# Patient Record
Sex: FEMALE | Race: WHITE | Employment: FULL TIME | URBAN - METROPOLITAN AREA
[De-identification: names, ages, dates, MRNs, and addresses within clinical notes are randomized per-mention and may not be internally consistent; named-entity substitution may affect disease eponyms.]

---

## 2017-01-31 ENCOUNTER — HOSPITAL ENCOUNTER (OUTPATIENT)
Dept: MAMMOGRAPHY | Age: 52
Discharge: HOME | End: 2017-01-31
Attending: FAMILY MEDICINE
Payer: COMMERCIAL

## 2017-01-31 DIAGNOSIS — Z01.419 ENCOUNTER FOR GYNECOLOGICAL EXAMINATION WITHOUT ABNORMAL FINDING: ICD-10-CM

## 2017-01-31 PROCEDURE — 77067 SCR MAMMO BI INCL CAD: CPT

## 2017-02-06 ENCOUNTER — OFFICE VISIT (OUTPATIENT)
Dept: FAMILY MEDICINE CLINIC | Age: 52
End: 2017-02-06

## 2017-02-06 VITALS
SYSTOLIC BLOOD PRESSURE: 140 MMHG | RESPIRATION RATE: 20 BRPM | HEIGHT: 62 IN | DIASTOLIC BLOOD PRESSURE: 73 MMHG | BODY MASS INDEX: 35.19 KG/M2 | WEIGHT: 191.2 LBS | TEMPERATURE: 97.9 F | HEART RATE: 74 BPM

## 2017-02-06 DIAGNOSIS — D22.9 NEVUS: Primary | ICD-10-CM

## 2017-02-06 DIAGNOSIS — I10 ESSENTIAL HYPERTENSION: ICD-10-CM

## 2017-02-06 NOTE — PROGRESS NOTES
HISTORY OF PRESENT ILLNESS  Kervin Villalta is a 45 y.o. female. Mole   The history is provided by the patient (right breast). This is a chronic problem. The current episode started more than 1 week ago. The problem occurs constantly. Progression since onset: it looked different last week, but is back to normal this week. Associated symptoms comments: No breast lumps or nipple discharge. Nothing aggravates the symptoms. Nothing relieves the symptoms. She has tried nothing for the symptoms. Review of Systems   Genitourinary:        No breast lumps or nipple discharge   Skin: Negative for itching. mole       Visit Vitals    /73 (BP 1 Location: Left arm, BP Patient Position: Sitting)    Pulse 74    Temp 97.9 °F (36.6 °C) (Oral)    Resp 20    Ht 5' 2\" (1.575 m)    Wt 191 lb 3.2 oz (86.7 kg)    LMP 01/24/2017 (Exact Date)    BMI 34.97 kg/m2     BP Readings from Last 3 Encounters:   02/06/17 140/73   10/31/16 122/72   09/19/16 130/68     Physical Exam   Constitutional: She is oriented to person, place, and time. She appears well-developed and well-nourished. No distress. Neurological: She is alert and oriented to person, place, and time. Skin: She is not diaphoretic. ASSESSMENT and PLAN    ICD-10-CM ICD-9-CM    1. Nevus D22.9 216.9    2. Essential hypertension I10 401.9         Benign nevus  Blood pressure elevated  Reassured about the benign nature of her nevus  Will follow blood pressure    Follow-up Disposition:  Return in about 6 weeks (around 3/20/2017) for blood pressure. Reviewed plan of care. Patient has provided input and agrees with goals.

## 2017-02-06 NOTE — MR AVS SNAPSHOT
Visit Information Date & Time Provider Department Dept. Phone Encounter #  
 2/6/2017  3:30 PM Alphonso CareyNick 34 [id number] Your Appointments 5/1/2017  8:00 AM  
ROUTINE CARE with Alphonso Carey MD  
[address] [facility]) Appt Note: 6 month follow up HTN  
 [address] [facility]  
[phone]  
  
   
 [address] [facility] [place] [facility] [address] [address] Upcoming Health Maintenance Date Due  
 BREAST CANCER SCRN MAMMOGRAM 1/31/2019 PAP AKA CERVICAL CYTOLOGY 10/31/2019 DTaP/Tdap/Td series (2 - Td) 7/21/2023 COLONOSCOPY 8/9/2023 Allergies as of 2/6/2017  Review Complete On: 2/6/2017 By: Alphonso Carey MD  
  
 Severity Noted Reaction Type Reactions Erythromycin  09/27/2011   Systemic Other (comments) Causes stomach pain Flagyl [Metronidazole]  09/28/2011    Nausea Only Pcn [Penicillins]  08/08/2011    Rash Current Immunizations  Reviewed on 10/31/2016 Name Date Influenza Vaccine 11/3/2014 Influenza Vaccine Split 11/27/2012, 9/27/2011 12:19 PM  
 Tdap 7/21/2013  1:52 PM  
  
 Not reviewed this visit Vitals BP Pulse Temp Resp Height(growth percentile) Weight(growth percentile) 140/73 (BP 1 Location: Left arm, BP Patient Position: Sitting) 74 97.9 °F (36.6 °C) (Oral) 20 5' 2\" (1.575 m) 191 lb 3.2 oz (86.7 kg) LMP BMI OB Status Smoking Status 01/24/2017 (Exact Date) 34.97 kg/m2 Having regular periods Never Smoker BMI and BSA Data Body Mass Index Body Surface Area 34.97 kg/m 2 1.95 m 2 Preferred Pharmacy Pharmacy Name Phone [facility] & [address] [phone] Your Updated Medication List  
  
   
This list is accurate as of: 2/6/17  4:02 PM.  Always use your most recent med list.  
  
  
  
  
 CALCIUM 600 + D 600-125 mg-unit Tab Generic drug:  calcium-cholecalciferol (d3) Take 600 mg by mouth two (2) times a day. ERGOCALCIFEROL (VITAMIN D2) PO Take 1,000 mg by mouth daily. metoprolol succinate 25 mg XL tablet Commonly known as:  TOPROL-XL  
take 1 tablet by mouth once daily  
  
 montelukast 10 mg tablet Commonly known as:  SINGULAIR  
take 1 tablet by mouth once daily  
  
 omeprazole 40 mg capsule Commonly known as:  PRILOSEC  
take 1 capsule by mouth once daily SPIRIVA WITH HANDIHALER 18 mcg inhalation capsule Generic drug:  tiotropium  
inhale the contents of one capsule in the handihaler once daily  
  
 vitamin E 400 unit capsule Commonly known as:  [address] Take 400 Units by mouth two (2) times a day. Introducing [facility] & HEALTH SERVICES! Dear Kelin Flannery: Thank you for requesting a Virool account. Our records indicate that you already have an active Virool account. You can access your account anytime at [email or web address] Did you know that you can access your hospital and ER discharge instructions at any time in Virool? You can also review all of your test results from your hospital stay or ER visit. Additional Information If you have questions, please visit the Frequently Asked Questions section of the Virool website at [email or web address] Remember, Virool is NOT to be used for urgent needs. For medical emergencies, dial 911. Now available from your iPhone and Android! Please provide this summary of care documentation to your next provider. Your primary care clinician is listed as Erica Scanlon. If you have any questions after today's visit, please call [phone].

## 2017-04-28 RX ORDER — METOPROLOL SUCCINATE 25 MG/1
TABLET, EXTENDED RELEASE ORAL
Qty: 30 TAB | Refills: 1 | Status: SHIPPED | OUTPATIENT
Start: 2017-04-28 | End: 2017-05-01 | Stop reason: SDUPTHER

## 2017-04-29 RX ORDER — MONTELUKAST SODIUM 10 MG/1
TABLET ORAL
Qty: 30 TAB | Refills: 11 | Status: SHIPPED | OUTPATIENT
Start: 2017-04-29 | End: 2018-04-12 | Stop reason: SDUPTHER

## 2017-05-01 ENCOUNTER — OFFICE VISIT (OUTPATIENT)
Dept: FAMILY MEDICINE CLINIC | Age: 52
End: 2017-05-01

## 2017-05-01 VITALS
HEART RATE: 80 BPM | WEIGHT: 191 LBS | SYSTOLIC BLOOD PRESSURE: 134 MMHG | HEIGHT: 62 IN | TEMPERATURE: 97.7 F | DIASTOLIC BLOOD PRESSURE: 71 MMHG | BODY MASS INDEX: 35.15 KG/M2 | RESPIRATION RATE: 20 BRPM

## 2017-05-01 DIAGNOSIS — K21.9 GASTROESOPHAGEAL REFLUX DISEASE, ESOPHAGITIS PRESENCE NOT SPECIFIED: ICD-10-CM

## 2017-05-01 DIAGNOSIS — I10 ESSENTIAL HYPERTENSION: Primary | ICD-10-CM

## 2017-05-01 RX ORDER — METOPROLOL SUCCINATE 25 MG/1
TABLET, EXTENDED RELEASE ORAL
Qty: 30 TAB | Refills: 11 | Status: SHIPPED | OUTPATIENT
Start: 2017-05-01 | End: 2018-05-16 | Stop reason: SDUPTHER

## 2017-05-01 NOTE — MR AVS SNAPSHOT
Visit Information Date & Time Provider Department Dept. Phone Encounter #  
 5/1/2017  8:00 AM Marco Garnett MD [facility] 34 [id number] Follow-up Instructions Return in about 6 months (around 11/1/2017) for blood pressure. Upcoming Health Maintenance Date Due INFLUENZA AGE 9 TO ADULT 8/1/2017 BREAST CANCER SCRN MAMMOGRAM 1/31/2019 PAP AKA CERVICAL CYTOLOGY 10/31/2019 DTaP/Tdap/Td series (2 - Td) 7/21/2023 COLONOSCOPY 8/9/2023 Allergies as of 5/1/2017  Review Complete On: 5/1/2017 By: Marco Garnett MD  
  
 Severity Noted Reaction Type Reactions Erythromycin  09/27/2011   Systemic Other (comments) Causes stomach pain Flagyl [Metronidazole]  09/28/2011    Nausea Only Pcn [Penicillins]  08/08/2011    Rash Current Immunizations  Reviewed on 10/31/2016 Name Date Influenza Vaccine 11/3/2014 Influenza Vaccine Split 11/27/2012, 9/27/2011 12:19 PM  
 Tdap 7/21/2013  1:52 PM  
  
 Not reviewed this visit You Were Diagnosed With   
  
 Codes Comments Essential hypertension    -  Primary ICD-10-CM: I10 
ICD-9-CM: 401.9 Gastroesophageal reflux disease, esophagitis presence not specified     ICD-10-CM: K21.9 ICD-9-CM: 530.81 Vitals BP Pulse Temp Resp Height(growth percentile) Weight(growth percentile) 134/71 80 97.7 °F (36.5 °C) (Oral) 20 5' 2\" (1.575 m) 191 lb (86.6 kg) BMI OB Status Smoking Status 34.93 kg/m2 Having regular periods Never Smoker BMI and BSA Data Body Mass Index Body Surface Area 34.93 kg/m 2 1.95 m 2 Preferred Pharmacy Pharmacy Name Phone [facility] [address] [phone] Your Updated Medication List  
  
   
This list is accurate as of: 5/1/17  8:23 AM.  Always use your most recent med list.  
  
  
  
  
 CALCIUM 600 + D 600-125 mg-unit Tab Generic drug:  calcium-cholecalciferol (d3) Take 600 mg by mouth two (2) times a day. ERGOCALCIFEROL (VITAMIN D2) PO Take 1,000 mg by mouth daily. metoprolol succinate 25 mg XL tablet Commonly known as:  TOPROL-XL  
take 1 tablet once daily  
  
 montelukast 10 mg tablet Commonly known as:  SINGULAIR  
take 1 tablet by mouth once daily  
  
 tiotropium 18 mcg inhalation capsule Commonly known as:  [address]  
inhale the contents of one capsule in the handihaler once daily  
  
 vitamin E 400 unit capsule Commonly known as:  [address] Take 400 Units by mouth two (2) times a day. Prescriptions Sent to Pharmacy Refills  
 metoprolol succinate (TOPROL-XL) 25 mg XL tablet 11 Sig: take 1 tablet once daily Class: Normal  
 Pharmacy: [facility] [address], [facility] [address] [facility] Ph #: [phone] We Performed the Following METABOLIC PANEL, BASIC [02737 CPT(R)] Follow-up Instructions Return in about 6 months (around 11/1/2017) for blood pressure. Introducing [facility] & HEALTH SERVICES! Dear Mimi Dean: Thank you for requesting a DeskActive account. Our records indicate that you already have an active DeskActive account. You can access your account anytime at [email or web address] Did you know that you can access your hospital and ER discharge instructions at any time in DeskActive? You can also review all of your test results from your hospital stay or ER visit. Additional Information If you have questions, please visit the Frequently Asked Questions section of the DeskActive website at [email or web address] Remember, DeskActive is NOT to be used for urgent needs. For medical emergencies, dial 911. Now available from your iPhone and Android! Please provide this summary of care documentation to your next provider. Your primary care clinician is listed as Shelia Mcclure.  If you have any questions after today's visit, please call [phone].

## 2017-05-01 NOTE — PROGRESS NOTES
HISTORY OF PRESENT ILLNESS  Yaw Gonzalez is a 45 y.o. female. Hypertension   The history is provided by the patient. This is a chronic problem. The current episode started more than 1 week ago. The problem occurs constantly. The problem has been gradually improving. Pertinent negatives include no chest pain, no abdominal pain, no headaches and no shortness of breath. Nothing aggravates the symptoms. The symptoms are relieved by medications. Treatments tried: Toprol XL. The treatment provided significant relief. Other   The history is provided by the patient (she would like to stop Prilosec. No GERD symptoms. ). Pertinent negatives include no chest pain, no abdominal pain, no headaches and no shortness of breath. Review of Systems   Constitutional: Negative for weight loss. No weight gain   Eyes: Negative for blurred vision. Respiratory: Negative for shortness of breath. Cardiovascular: Negative for chest pain and leg swelling. Gastrointestinal: Negative for abdominal pain, heartburn, nausea and vomiting. Neurological: Negative for dizziness, sensory change, speech change, focal weakness and headaches. Visit Vitals    /71    Pulse 80    Temp 97.7 °F (36.5 °C) (Oral)    Resp 20    Ht 5' 2\" (1.575 m)    Wt 191 lb (86.6 kg)    BMI 34.93 kg/m2     BP Readings from Last 3 Encounters:   05/01/17 134/71   02/06/17 140/73   10/31/16 122/72     Physical Exam   Constitutional: She is oriented to person, place, and time. She appears well-developed and well-nourished. No distress. Cardiovascular: Normal rate, regular rhythm and normal heart sounds. Exam reveals no gallop and no friction rub. No murmur heard. Pulmonary/Chest: Effort normal and breath sounds normal. No respiratory distress. She has no wheezes. She has no rales. Musculoskeletal: She exhibits no edema. Neurological: She is alert and oriented to person, place, and time. Skin: Skin is warm and dry.  She is not diaphoretic. Nursing note and vitals reviewed. ASSESSMENT and PLAN    ICD-10-CM ICD-9-CM    1. Essential hypertension U68 801.9 METABOLIC PANEL, BASIC   2. Gastroesophageal reflux disease, esophagitis presence not specified K21.9 530.81         Blood pressure controlled  Asymptomatic gastroesophageal reflux disease  Labs per orders. Continue current plans. Trial off of Prilosec - she will let me know if she develops symptoms    Follow-up Disposition:  Return in about 6 months (around 11/1/2017) for blood pressure. Reviewed plan of care. Patient has provided input and agrees with goals.

## 2017-05-02 LAB
BUN SERPL-MCNC: 10 MG/DL (ref 6–24)
BUN/CREAT SERPL: 14 (ref 9–23)
CALCIUM SERPL-MCNC: 10 MG/DL (ref 8.7–10.2)
CHLORIDE SERPL-SCNC: 100 MMOL/L (ref 96–106)
CO2 SERPL-SCNC: 25 MMOL/L (ref 18–29)
CREAT SERPL-MCNC: 0.74 MG/DL (ref 0.57–1)
GLUCOSE SERPL-MCNC: 95 MG/DL (ref 65–99)
POTASSIUM SERPL-SCNC: 4.7 MMOL/L (ref 3.5–5.2)
SODIUM SERPL-SCNC: 142 MMOL/L (ref 134–144)

## 2017-05-19 ENCOUNTER — OFFICE VISIT (OUTPATIENT)
Dept: FAMILY MEDICINE CLINIC | Age: 52
End: 2017-05-19

## 2017-05-19 VITALS
RESPIRATION RATE: 20 BRPM | BODY MASS INDEX: 35.15 KG/M2 | DIASTOLIC BLOOD PRESSURE: 68 MMHG | HEART RATE: 56 BPM | SYSTOLIC BLOOD PRESSURE: 135 MMHG | WEIGHT: 191 LBS | HEIGHT: 62 IN | TEMPERATURE: 98 F

## 2017-05-19 DIAGNOSIS — K21.9 GASTROESOPHAGEAL REFLUX DISEASE, ESOPHAGITIS PRESENCE NOT SPECIFIED: Primary | ICD-10-CM

## 2017-05-19 RX ORDER — RANITIDINE 300 MG/1
300 TABLET ORAL DAILY
Qty: 30 TAB | Refills: 1 | Status: SHIPPED | OUTPATIENT
Start: 2017-05-19 | End: 2017-07-12 | Stop reason: SDUPTHER

## 2017-05-19 RX ORDER — BISMUTH SUBSALICYLATE 262 MG
1 TABLET,CHEWABLE ORAL DAILY
COMMUNITY
End: 2018-04-09

## 2017-05-19 NOTE — PROGRESS NOTES
HISTORY OF PRESENT ILLNESS  Caitlyn Lehman is a 45 y.o. female. GERD   The history is provided by the patient. This is a chronic problem. The current episode started more than 1 week ago. Episode frequency: nightly. The problem has been gradually worsening. Pertinent negatives include no chest pain, no abdominal pain and no shortness of breath. Exacerbated by: stopping Prilosec. Nothing relieves the symptoms. She has tried nothing for the symptoms. Review of Systems   HENT:        Burning in the back of the throat   Respiratory: Positive for cough. Negative for sputum production, shortness of breath and wheezing. Cardiovascular: Negative for chest pain. Gastrointestinal: Positive for heartburn and nausea. Negative for abdominal pain and vomiting. Visit Vitals    /68    Pulse (!) 56    Temp 98 °F (36.7 °C) (Oral)    Resp 20    Ht 5' 2\" (1.575 m)    Wt 191 lb (86.6 kg)    BMI 34.93 kg/m2     Physical Exam   Constitutional: She is oriented to person, place, and time. She appears well-developed and well-nourished. No distress. Cardiovascular: Normal rate, regular rhythm and normal heart sounds. Exam reveals no gallop and no friction rub. No murmur heard. Pulmonary/Chest: Effort normal and breath sounds normal. No respiratory distress. She has no wheezes. She has no rales. Abdominal: Soft. Normal appearance and bowel sounds are normal. She exhibits no distension. There is no hepatosplenomegaly. There is no tenderness. There is no rebound and no guarding. Neurological: She is alert and oriented to person, place, and time. Skin: Skin is warm and dry. She is not diaphoretic. Nursing note and vitals reviewed. ASSESSMENT and PLAN    ICD-10-CM ICD-9-CM    1.  Gastroesophageal reflux disease, esophagitis presence not specified K21.9 530.81 raNITIdine (ZANTAC) 300 mg tablet        Symptomatic now that she has stopped Prilosec  Start Zantac    Follow-up Disposition:  Return in about 6 weeks (around 6/30/2017) for GERD. Reviewed plan of care. Patient has provided input and agrees with goals.

## 2017-05-19 NOTE — MR AVS SNAPSHOT
Visit Information Date & Time Provider Department Dept. Phone Encounter #  
 5/19/2017  1:15 PM Letty Goltz, MD [address] [address] [id number] Follow-up Instructions Return in about 6 weeks (around 6/30/2017) for GERD. Your Appointments 11/1/2017  8:15 AM  
COMPLETE PHYSICAL with Letty Goltz, MD  
[address] [facility]) Appt Note: Complete Physical, BP f/u  
 [address] [facility] [address] [facility]  
[phone]  
  
   
 [address] [place] [address] [address] [address] Upcoming Health Maintenance Date Due INFLUENZA AGE 9 TO ADULT 8/1/2017 BREAST CANCER SCRN MAMMOGRAM 1/31/2019 PAP AKA CERVICAL CYTOLOGY 10/31/2019 DTaP/Tdap/Td series (2 - Td) 7/21/2023 COLONOSCOPY 8/9/2023 Allergies as of 5/19/2017  Review Complete On: 5/19/2017 By: Letty Goltz, MD  
  
 Severity Noted Reaction Type Reactions Erythromycin  09/27/2011   Systemic Other (comments) Causes stomach pain Flagyl [Metronidazole]  09/28/2011    Nausea Only Pcn [Penicillins]  08/08/2011    Rash Current Immunizations  Reviewed on 10/31/2016 Name Date Influenza Vaccine 11/3/2014 Influenza Vaccine Split 11/27/2012, 9/27/2011 12:19 PM  
 Tdap 7/21/2013  1:52 PM  
  
 Not reviewed this visit You Were Diagnosed With   
  
 Codes Comments Gastroesophageal reflux disease, esophagitis presence not specified    -  Primary ICD-10-CM: K21.9 ICD-9-CM: 530.81 Vitals BP Pulse Temp Resp Height(growth percentile) Weight(growth percentile) 135/68 (!) 56 98 °F (36.7 °C) (Oral) 20 5' 2\" (1.575 m) 191 lb (86.6 kg) BMI OB Status Smoking Status 34.93 kg/m2 Having regular periods Never Smoker Vitals History BMI and BSA Data Body Mass Index Body Surface Area 34.93 kg/m 2 1.95 m 2 Preferred Pharmacy Pharmacy Name Phone [facility] [address] [facility] [phone] Your Updated Medication List  
  
   
This list is accurate as of: 5/19/17  1:39 PM.  Always use your most recent med list.  
  
  
  
  
 CALCIUM 600 + D 600-125 mg-unit Tab Generic drug:  calcium-cholecalciferol (d3) Take 600 mg by mouth two (2) times a day. ERGOCALCIFEROL (VITAMIN D2) PO Take 1,000 mg by mouth daily. metoprolol succinate 25 mg XL tablet Commonly known as:  TOPROL-XL  
take 1 tablet once daily  
  
 montelukast 10 mg tablet Commonly known as:  SINGULAIR  
take 1 tablet by mouth once daily  
  
 multivitamin tablet Commonly known as:  ONE A DAY Take 1 Tab by mouth daily. raNITIdine 300 mg tablet Commonly known as:  ZANTAC Take 1 Tab by mouth daily. tiotropium 18 mcg inhalation capsule Commonly known as:  [address]  
inhale the contents of one capsule in the handihaler once daily  
  
 vitamin E 400 unit capsule Commonly known as:  [address] Take 400 Units by mouth two (2) times a day. Prescriptions Sent to Pharmacy Refills  
 raNITIdine (ZANTAC) 300 mg tablet 1 Sig: Take 1 Tab by mouth daily. Class: Normal  
 Pharmacy: [id number], [facility] [address] [facility] Ph #: [phone] Route: Oral  
  
Follow-up Instructions Return in about 6 weeks (around 6/30/2017) for GERD. Introducing [facility] & HEALTH SERVICES! Dear Charis Sidhu: Thank you for requesting a Star Scientific account. Our records indicate that you already have an active Star Scientific account. You can access your account anytime at [email or web address] Did you know that you can access your hospital and ER discharge instructions at any time in Star Scientific? You can also review all of your test results from your hospital stay or ER visit. Additional Information If you have questions, please visit the Frequently Asked Questions section of the [email or web address] website at [email or web address] Remember, [email or web address] is NOT to be used for urgent needs. For medical emergencies, dial 911. Now available from your iPhone and Android! Please provide this summary of care documentation to your next provider. Your primary care clinician is listed as Mariam Saucedo. If you have any questions after today's visit, please call [phone].

## 2017-05-25 DIAGNOSIS — R11.0 NAUSEA: ICD-10-CM

## 2017-05-25 DIAGNOSIS — K21.9 GASTROESOPHAGEAL REFLUX DISEASE, ESOPHAGITIS PRESENCE NOT SPECIFIED: ICD-10-CM

## 2017-05-25 DIAGNOSIS — R05.9 COUGH: ICD-10-CM

## 2017-05-25 RX ORDER — OMEPRAZOLE 40 MG/1
40 CAPSULE, DELAYED RELEASE ORAL DAILY
Qty: 30 CAP | Refills: 11 | Status: SHIPPED | OUTPATIENT
Start: 2017-05-25 | End: 2017-06-30

## 2017-06-30 ENCOUNTER — OFFICE VISIT (OUTPATIENT)
Dept: FAMILY MEDICINE CLINIC | Age: 52
End: 2017-06-30

## 2017-06-30 VITALS
HEIGHT: 62 IN | WEIGHT: 191 LBS | BODY MASS INDEX: 35.15 KG/M2 | TEMPERATURE: 98.3 F | DIASTOLIC BLOOD PRESSURE: 58 MMHG | RESPIRATION RATE: 20 BRPM | SYSTOLIC BLOOD PRESSURE: 123 MMHG

## 2017-06-30 DIAGNOSIS — K21.9 GASTROESOPHAGEAL REFLUX DISEASE, ESOPHAGITIS PRESENCE NOT SPECIFIED: Primary | ICD-10-CM

## 2017-06-30 DIAGNOSIS — M79.601 MUSCULOSKELETAL ARM PAIN, RIGHT: ICD-10-CM

## 2017-06-30 RX ORDER — IBUPROFEN 600 MG/1
600 TABLET ORAL
Qty: 40 TAB | Refills: 0 | Status: SHIPPED | OUTPATIENT
Start: 2017-06-30 | End: 2018-04-09

## 2017-06-30 NOTE — PROGRESS NOTES
Chief Complaint   Patient presents with    GERD     follow up     Arm Pain     right arm when raising it      Health Maintenance   Topic Date Due    INFLUENZA AGE 9 TO ADULT  08/01/2017    BREAST CANCER SCRN MAMMOGRAM  01/31/2019    PAP AKA CERVICAL CYTOLOGY  10/31/2019    DTaP/Tdap/Td series (2 - Td) 07/21/2023    COLONOSCOPY  08/09/2023    Hepatitis C Screening  Completed     1. Have you been to the ER, urgent care clinic since your last visit? Hospitalized since your last visit? No    2. Have you seen or consulted any other health care providers outside of the [address] [place] [address] since your last visit? Include any pap smears or colon screening.  No

## 2017-06-30 NOTE — PROGRESS NOTES
HISTORY OF PRESENT ILLNESS  Michael Wilson is a 45 y.o. female. GERD   The history is provided by the patient (she switched from Prilosec to Zantac). This is a chronic problem. The current episode started more than 1 week ago. Episode frequency: 2-3 days every 2 weeks. The problem has been gradually worsening. Pertinent negatives include no abdominal pain. Associated symptoms comments: Burning at the base of the neck. The symptoms are trace. . Exacerbated by: certain foods. The symptoms are relieved by medications. Treatments tried: Zantac. The treatment provided significant relief. Arm Pain   The history is provided by the patient (right pain when abbducting it at shoulder level. No history of trauma. ). This is a new problem. Episode onset: about 2 weeks ago. The problem occurs daily. The problem has not changed since onset. Pertinent negatives include no abdominal pain. Associated symptoms comments: No radiation. Nothing (certain arms positions) aggravates the symptoms. Nothing relieves the symptoms. She has tried nothing for the symptoms. Review of Systems   Constitutional: Negative for weight loss. No weight gain   Respiratory: Negative for cough and wheezing. Gastrointestinal: Positive for heartburn. Negative for abdominal pain. Neurological: Positive for tingling, sensory change and focal weakness. Visit Vitals    /58    Temp 98.3 °F (36.8 °C) (Oral)    Resp 20    Ht 5' 2\" (1.575 m)    Wt 191 lb (86.6 kg)    BMI 34.93 kg/m2     Physical Exam   Constitutional: She is oriented to person, place, and time. She appears well-developed and well-nourished. No distress. Cardiovascular: Normal rate, regular rhythm and normal heart sounds. Exam reveals no gallop and no friction rub. No murmur heard. Pulmonary/Chest: Effort normal and breath sounds normal. No respiratory distress. She has no wheezes. She has no rales. Abdominal: Soft.  Normal appearance and bowel sounds are normal. She exhibits no distension. There is no hepatosplenomegaly. There is no tenderness. There is no rebound and no guarding. Musculoskeletal:        Right shoulder: She exhibits normal range of motion, no tenderness, no bony tenderness and normal strength. Right upper arm: She exhibits tenderness. She exhibits no bony tenderness and no swelling. Right triceps is mildly tenderness   Neurological: She is alert and oriented to person, place, and time. Skin: Skin is warm and dry. She is not diaphoretic. Nursing note and vitals reviewed. ASSESSMENT and PLAN    ICD-10-CM ICD-9-CM    1. Gastroesophageal reflux disease, esophagitis presence not specified K21.9 530.81    2. Musculoskeletal arm pain, right M79.601 729.5 ibuprofen (MOTRIN) 600 mg tablet        Gastroesophageal reflux disease doing well off PPI and on Zantac  Arm pain likely due to overuse  Continue current plans. Rest, heat, stretching, Motrin prn arm pain    Follow-up Disposition:  Return if symptoms worsen or fail to improve. Reviewed plan of care. Patient has provided input and agrees with goals.

## 2017-06-30 NOTE — MR AVS SNAPSHOT
Visit Information Date & Time Provider Department Dept. Phone Encounter #  
 6/30/2017  8:00 AM Dorina Gonzalez, [address] [id number] Follow-up Instructions Return if symptoms worsen or fail to improve. Your Appointments 11/1/2017  8:15 AM  
COMPLETE PHYSICAL with Dorina Gonzalez MD  
[address]) Appt Note: Complete Physical, BP f/u  
 [address] [phone] [address] [facility] [address] [address] Upcoming Health Maintenance Date Due INFLUENZA AGE 9 TO ADULT 8/1/2017 BREAST CANCER SCRN MAMMOGRAM 1/31/2019 PAP AKA CERVICAL CYTOLOGY 10/31/2019 DTaP/Tdap/Td series (2 - Td) 7/21/2023 COLONOSCOPY 8/9/2023 Allergies as of 6/30/2017  Review Complete On: 6/30/2017 By: Dorina Gonzalez MD  
  
 Severity Noted Reaction Type Reactions Erythromycin  09/27/2011   Systemic Other (comments) Causes stomach pain Flagyl [Metronidazole]  09/28/2011    Nausea Only Pcn [Penicillins]  08/08/2011    Rash Current Immunizations  Reviewed on 10/31/2016 Name Date Influenza Vaccine 11/3/2014 Influenza Vaccine Split 11/27/2012, 9/27/2011 12:19 PM  
 Tdap 7/21/2013  1:52 PM  
  
 Not reviewed this visit You Were Diagnosed With   
  
 Codes Comments Gastroesophageal reflux disease, esophagitis presence not specified    -  Primary ICD-10-CM: K21.9 ICD-9-CM: 530.81 Musculoskeletal arm pain, right     ICD-10-CM: M79.601 ICD-9-CM: 729.5 Vitals BP Temp Resp Height(growth percentile) Weight(growth percentile) BMI  
 123/58 98.3 °F (36.8 °C) (Oral) 20 5' 2\" (1.575 m) 191 lb (86.6 kg) 34.93 kg/m2 OB Status Smoking Status Having regular periods Never Smoker Vitals History BMI and BSA Data Body Mass Index Body Surface Area 34.93 kg/m 2 1.95 m 2 [facility]erred [facility] Phone [id number] [address] [facility] [phone] Your Updated Medication List  
  
   
This list is accurate as of: 6/30/17  8:23 AM.  Always use your most recent med list.  
  
  
  
  
 CALCIUM 600 + D 600-125 mg-unit Tab Generic drug:  calcium-cholecalciferol (d3) Take 600 mg by mouth two (2) times a day. ERGOCALCIFEROL (VITAMIN D2) PO Take 1,000 mg by mouth daily. ibuprofen 600 mg tablet Commonly known as:  MOTRIN Take 1 Tab by mouth every six (6) hours as needed for Pain.  
  
 metoprolol succinate 25 mg XL tablet Commonly known as:  TOPROL-XL  
take 1 tablet once daily  
  
 montelukast 10 mg tablet Commonly known as:  SINGULAIR  
take 1 tablet by mouth once daily  
  
 multivitamin tablet Commonly known as:  ONE A DAY Take 1 Tab by mouth daily. raNITIdine 300 mg tablet Commonly known as:  ZANTAC Take 1 Tab by mouth daily. tiotropium 18 mcg inhalation capsule Commonly known as:  [employer]  
inhale the contents of one capsule in the handihaler once daily  
  
 vitamin E 400 unit capsule Commonly known as:  [address] Take 400 Units by mouth two (2) times a day. Prescriptions Sent to Pharmacy Refills  
 ibuprofen (MOTRIN) 600 mg tablet 0 Sig: Take 1 Tab by mouth every six (6) hours as needed for Pain. Class: Normal  
 Pharmacy: [id number], [address] [place] [address] Ph #: [phone] Route: Oral  
  
Follow-up Instructions Return if symptoms worsen or fail to improve. Introducing [facility] & HEALTH SERVICES! Dear Mendel Councilman: Thank you for requesting a ZeroFOX account. Our records indicate that you already have an active ZeroFOX account. You can access your account anytime at [email or web address] Did you know that you can access your hospital and ER discharge instructions at any time in ZeroFOX?   You can also review all of your test results from your hospital stay or ER visit. Additional Information If you have questions, please visit the Frequently Asked Questions section of the KitchIn website at [email or web address] Remember, KitchIn is NOT to be used for urgent needs. For medical emergencies, dial 911. Now available from your iPhone and Android! Please provide this summary of care documentation to your next provider. Your primary care clinician is listed as Nasir Singh. If you have any questions after today's visit, please call [phone].

## 2017-07-12 DIAGNOSIS — K21.9 GASTROESOPHAGEAL REFLUX DISEASE, ESOPHAGITIS PRESENCE NOT SPECIFIED: ICD-10-CM

## 2017-07-13 RX ORDER — RANITIDINE 300 MG/1
TABLET ORAL
Qty: 30 TAB | Refills: 11 | Status: SHIPPED | OUTPATIENT
Start: 2017-07-13 | End: 2018-07-05 | Stop reason: SDUPTHER

## 2017-10-31 ENCOUNTER — OFFICE VISIT (OUTPATIENT)
Dept: FAMILY MEDICINE CLINIC | Age: 52
End: 2017-10-31

## 2017-10-31 VITALS
RESPIRATION RATE: 18 BRPM | HEIGHT: 62 IN | HEART RATE: 72 BPM | DIASTOLIC BLOOD PRESSURE: 83 MMHG | WEIGHT: 185 LBS | BODY MASS INDEX: 34.04 KG/M2 | SYSTOLIC BLOOD PRESSURE: 133 MMHG | TEMPERATURE: 98 F

## 2017-10-31 DIAGNOSIS — Z00.00 ROUTINE GENERAL MEDICAL EXAMINATION AT A HEALTH CARE FACILITY: Primary | ICD-10-CM

## 2017-10-31 DIAGNOSIS — E78.00 HYPERCHOLESTEROLEMIA: ICD-10-CM

## 2017-10-31 DIAGNOSIS — Z12.11 SPECIAL SCREENING FOR MALIGNANT NEOPLASMS, COLON: ICD-10-CM

## 2017-10-31 DIAGNOSIS — D86.0 PULMONARY SARCOIDOSIS (HCC): ICD-10-CM

## 2017-10-31 DIAGNOSIS — I10 ESSENTIAL HYPERTENSION: ICD-10-CM

## 2017-10-31 NOTE — PROGRESS NOTES
Subjective:  Richard Stahl is a 45 y.o. female here for annual physical exam.  Her PAP is up to date. Health Habits. Lifestyle:  Occupation:    Household members:  1, patient  Doing a monthly breast self exam:  yes  Last dental appointment:    This past summer  Last eye exam:  This past June  Uses seatbelts regularly :  yes  Getting regular exercise:  no  Last colonoscopy:   2013  Last mammogram:  1/2017       Patient Active Problem List   Diagnosis Code    [facility] D[facility]    Obesity E66.9    Essential hypertension I10    Palpitations R00.2    GERD (gastroesophageal reflux disease) K21.9    Celiac disease K90.0    DEL ANGEL (nonalcoholic steatohepatitis) K75.81     Past Medical History:   Diagnosis Date    Celiac disease 10/29/2015    Contact dermatitis and other eczema, due to unspecified cause     Essential hypertension 2/27/2013    GERD (gastroesophageal reflux disease) 11/3/2014    HTN (hypertension) 2/27/2013    DEL ANGEL (nonalcoholic steatohepatitis) 10/29/2015    Rosacea 8/31/2011    Sarcoidosis 2007    Vitamin D deficiency 8/31/2011     Past Surgical History:   Procedure Laterality Date    HX OTHER SURGICAL      Biospy of lymph nodes chest      Family History   Problem Relation Age of Onset    Cancer Father     Hypertension Father     Breast Cancer Maternal Grandmother     Diabetes Maternal Grandmother     Stroke Maternal Grandmother     Cancer Paternal Grandmother     Cancer Paternal Grandfather     Hypertension Mother      Social History   Substance Use Topics    Smoking status: Never Smoker    Smokeless tobacco: Never Used    Alcohol use No     Allergies   Allergen Reactions    Erythromycin Other (comments)     Causes stomach pain    Flagyl [Metronidazole] Nausea Only    Pcn [Penicillins] Rash     Current Outpatient Prescriptions   Medication Sig Dispense Refill    raNITIdine (ZANTAC) 300 mg tablet take 1 tablet by mouth once daily 30 Tab 11  ibuprofen (MOTRIN) 600 mg tablet Take 1 Tab by mouth every six (6) hours as needed for Pain. 40 Tab 0    multivitamin (ONE A DAY) tablet Take 1 Tab by mouth daily.  metoprolol succinate (TOPROL-XL) 25 mg XL tablet take 1 tablet once daily 30 Tab 11    montelukast (SINGULAIR) 10 mg tablet take 1 tablet by mouth once daily 30 Tab 11    tiotropium (SPIRIVA WITH HANDIHALER) 18 mcg inhalation capsule inhale the contents of one capsule in the handihaler once daily 30 Cap 11    ERGOCALCIFEROL, VITAMIN D2, PO Take 1,000 mg by mouth daily.  vitamin E (AQUA GEMS) 400 unit capsule Take 400 Units by mouth two (2) times a day.  calcium-cholecalciferol, d3, (CALCIUM 600 + D) 600-125 mg-unit Tab Take 600 mg by mouth two (2) times a day.           Review of Systems  A comprehensive review of systems was negative except for: Respiratory: positive for occasional wheezing due to her sarcoidosis  Integument/breast: positive for occasional itching the posterior calves, no rash  Allergic/Immunologic: positive for hay fever    Objective:  Visit Vitals    /83    Pulse 72    Temp 98 °F (36.7 °C) (Oral)    Resp 18    Ht 5' 2\" (1.575 m)    Wt 185 lb (83.9 kg)    BMI 33.84 kg/m2     Physical Examination:   General appearance - alert, well appearing, and in no distress  Mental status - alert, oriented to person, place, and time, normal mood, behavior, speech, dress, motor activity, and thought processes  Eyes - pupils equal and reactive, extraocular eye movements intact, sclera anicteric  Ears - bilateral TM's and external ear canals normal  Nose - normal and patent, no erythema, discharge or polyps  Mouth - mucous membranes moist, pharynx normal without lesions and dental hygiene good  Neck - supple, no significant adenopathy, carotids upstroke normal bilaterally, no bruits, thyroid exam: thyroid is normal in size without nodules or tenderness  Lymphatics - no palpable lymphadenopathy, no hepatosplenomegaly  Chest - clear to auscultation, no wheezes, rales or rhonchi, symmetric air entry  Heart - normal rate, regular rhythm, normal S1, S2, no murmurs, rubs, clicks or gallops  Abdomen - soft, nontender, nondistended, no masses or organomegaly  bowel sounds normal  Breasts - breasts appear normal, no suspicious masses, no skin or nipple changes or axillary nodes  Pelvic - normal external genitalia, vulva, vagina, cervix, uterus and adnexa  Rectal - normal rectal, no masses  Neurological - alert, oriented, normal speech, no focal findings or movement disorder noted  Musculoskeletal - normal gait  Extremities - peripheral pulses normal, no pedal edema, no clubbing or cyanosis  Skin - normal coloration and turgor, no rashes, no suspicious skin lesions noted     Assessment/Plan:    ICD-10-CM ICD-9-CM    1. Routine general medical examination at a health care facility Z00.00 V70.0 CBC WITH AUTOMATED DIFF   2. Essential hypertension I05 662.9 METABOLIC PANEL, COMPREHENSIVE   3. Hypercholesterolemia R07.50 160.5 METABOLIC PANEL, COMPREHENSIVE      LIPID PANEL   4. Pulmonary sarcoidosis (HCC) D86.0 135      517.8    5. Special screening for malignant neoplasms, colon Z12.11 V76.51 OCCULT BLOOD, IMMUNOASSAY (FIT)         Regular exercise  Labs per orders. Follow-up Disposition:  Return in about 6 months (around 4/30/2018) for blood pressure. Reviewed plan of care. Patient has provided input and agrees with goals.

## 2017-10-31 NOTE — PROGRESS NOTES
Chief Complaint   Patient presents with    Well Woman     No PAP     Health Maintenance   Topic Date Due    INFLUENZA AGE 9 TO ADULT  08/01/2017    BREAST CANCER SCRN MAMMOGRAM  01/31/2019    PAP AKA CERVICAL CYTOLOGY  10/31/2019    DTaP/Tdap/Td series (2 - Td) 07/21/2023    COLONOSCOPY  08/09/2023    Hepatitis C Screening  Completed

## 2017-11-01 LAB
ALBUMIN SERPL-MCNC: 4.3 G/DL (ref 3.5–5.5)
ALBUMIN/GLOB SERPL: 1.4 {RATIO} (ref 1.2–2.2)
ALP SERPL-CCNC: 133 IU/L (ref 39–117)
ALT SERPL-CCNC: 36 IU/L (ref 0–32)
AST SERPL-CCNC: 24 IU/L (ref 0–40)
BASOPHILS # BLD AUTO: 0.1 X10E3/UL (ref 0–0.2)
BASOPHILS NFR BLD AUTO: 1 %
BILIRUB SERPL-MCNC: 0.4 MG/DL (ref 0–1.2)
BUN SERPL-MCNC: 11 MG/DL (ref 6–24)
BUN/CREAT SERPL: 13 (ref 9–23)
CALCIUM SERPL-MCNC: 9.9 MG/DL (ref 8.7–10.2)
CHLORIDE SERPL-SCNC: 102 MMOL/L (ref 96–106)
CHOLEST SERPL-MCNC: 176 MG/DL (ref 100–199)
CO2 SERPL-SCNC: 25 MMOL/L (ref 18–29)
CREAT SERPL-MCNC: 0.88 MG/DL (ref 0.57–1)
EOSINOPHIL # BLD AUTO: 0.4 X10E3/UL (ref 0–0.4)
EOSINOPHIL NFR BLD AUTO: 6 %
ERYTHROCYTE [DISTWIDTH] IN BLOOD BY AUTOMATED COUNT: 15.1 % (ref 12.3–15.4)
GFR SERPLBLD CREATININE-BSD FMLA CKD-EPI: 75 ML/MIN/1.73
GFR SERPLBLD CREATININE-BSD FMLA CKD-EPI: 86 ML/MIN/1.73
GLOBULIN SER CALC-MCNC: 3.1 G/DL (ref 1.5–4.5)
GLUCOSE SERPL-MCNC: 87 MG/DL (ref 65–99)
HCT VFR BLD AUTO: 43.8 % (ref 34–46.6)
HDLC SERPL-MCNC: 43 MG/DL
HGB BLD-MCNC: 14.5 G/DL (ref 11.1–15.9)
IMM GRANULOCYTES # BLD: 0 X10E3/UL (ref 0–0.1)
IMM GRANULOCYTES NFR BLD: 0 %
INTERPRETATION, 910389: NORMAL
LDLC SERPL CALC-MCNC: 103 MG/DL (ref 0–99)
LYMPHOCYTES # BLD AUTO: 1.6 X10E3/UL (ref 0.7–3.1)
LYMPHOCYTES NFR BLD AUTO: 26 %
MCH RBC QN AUTO: 29.4 PG (ref 26.6–33)
MCHC RBC AUTO-ENTMCNC: 33.1 G/DL (ref 31.5–35.7)
MCV RBC AUTO: 89 FL (ref 79–97)
MONOCYTES # BLD AUTO: 0.5 X10E3/UL (ref 0.1–0.9)
MONOCYTES NFR BLD AUTO: 8 %
NEUTROPHILS # BLD AUTO: 3.6 X10E3/UL (ref 1.4–7)
NEUTROPHILS NFR BLD AUTO: 59 %
PLATELET # BLD AUTO: 263 X10E3/UL (ref 150–379)
POTASSIUM SERPL-SCNC: 4.3 MMOL/L (ref 3.5–5.2)
PROT SERPL-MCNC: 7.4 G/DL (ref 6–8.5)
RBC # BLD AUTO: 4.94 X10E6/UL (ref 3.77–5.28)
SODIUM SERPL-SCNC: 144 MMOL/L (ref 134–144)
TRIGL SERPL-MCNC: 148 MG/DL (ref 0–149)
VLDLC SERPL CALC-MCNC: 30 MG/DL (ref 5–40)
WBC # BLD AUTO: 6 X10E3/UL (ref 3.4–10.8)

## 2018-02-26 NOTE — MR AVS SNAPSHOT
Dictation #1  MRN:[id number]  CSN:[id number]     Visit Information Date & Time Provider Department Dept. Phone Encounter #  
 10/31/2017  8:15 AM Tre FerreraNick 34 [id number] Upcoming Health Maintenance Date Due  
 BREAST CANCER SCRN MAMMOGRAM 1/31/2019 PAP AKA CERVICAL CYTOLOGY 10/31/2019 DTaP/Tdap/Td series (2 - Td) 7/21/2023 COLONOSCOPY 8/9/2023 Allergies as of 10/31/2017  Review Complete On: 10/31/2017 By: Tre eFrrera MD  
  
 Severity Noted Reaction Type Reactions Erythromycin  09/27/2011   Systemic Other (comments) Causes stomach pain Flagyl [Metronidazole]  09/28/2011    Nausea Only Pcn [Penicillins]  08/08/2011    Rash Current Immunizations  Reviewed on 10/31/2016 Name Date Influenza Vaccine 10/12/2017, 11/3/2014 Influenza Vaccine Split 11/27/2012, 9/27/2011 12:19 PM  
 Tdap 7/21/2013  1:52 PM  
  
 Not reviewed this visit You Were Diagnosed With   
  
 Codes Comments Routine general medical examination at a health care facility    -  Primary ICD-10-CM: Z00.00 ICD-9-CM: V70.0 Essential hypertension     ICD-10-CM: I10 
ICD-9-CM: 401.9 Hypercholesterolemia     ICD-10-CM: E78.00 ICD-9-CM: 272.0 [facility] ([facility]     ICD-10-CM: D86.0 ICD-9-CM: 135, 517.8 Special screening for malignant neoplasms, colon     ICD-10-CM: Z12.11 ICD-9-CM: V76.51 Vitals BP Pulse Temp Resp Height(growth percentile) Weight(growth percentile) 133/83 72 98 °F (36.7 °C) (Oral) 18 5' 2\" (1.575 m) 185 lb (83.9 kg) BMI OB Status Smoking Status 33.84 kg/m2 Having regular periods Never Smoker BMI and BSA Data Body Mass Index Body Surface Area  
 33.84 kg/m 2 1.92 m 2 Preferred Pharmacy Pharmacy Name Phone [id number] [address] [phone] Your Updated Medication List  
  
   
This list is accurate as of: 10/31/17  9:04 AM.  Always use your most recent med list.  
  
  
  
  
 CALCIUM 600 + D 600-125 mg-unit Tab Generic drug:  calcium-cholecalciferol (d3) Take 600 mg by mouth two (2) times a day. ERGOCALCIFEROL (VITAMIN D2) PO Take 1,000 mg by mouth daily. ibuprofen 600 mg tablet Commonly known as:  MOTRIN Take 1 Tab by mouth every six (6) hours as needed for Pain.  
  
 metoprolol succinate 25 mg XL tablet Commonly known as:  TOPROL-XL  
take 1 tablet once daily  
  
 montelukast 10 mg tablet Commonly known as:  SINGULAIR  
take 1 tablet by mouth once daily  
  
 multivitamin tablet Commonly known as:  ONE A DAY Take 1 Tab by mouth daily. raNITIdine 300 mg tablet Commonly known as:  ZANTAC  
take 1 tablet by mouth once daily  
  
 tiotropium 18 mcg inhalation capsule Commonly known as:  [address]  
inhale the contents of one capsule in the handihaler once daily  
  
 vitamin E 400 unit capsule Commonly known as:  [address] Take 400 Units by mouth two (2) times a day. We Performed the Following CBC WITH AUTOMATED DIFF [25765 CPT(R)] LIPID PANEL [93599 CPT(R)] METABOLIC PANEL, COMPREHENSIVE [90787 CPT(R)] OCCULT BLOOD, IMMUNOASSAY (FIT) [id number] CPT(R)] Introducing [facility] & HEALTH SERVICES! Dear Anna Hewitt: Thank you for requesting a PushPage account. Our records indicate that you already have an active PushPage account. You can access your account anytime at [email or web address] Did you know that you can access your hospital and ER discharge instructions at any time in PushPage? You can also review all of your test results from your hospital stay or ER visit. Additional Information If you have questions, please visit the Frequently Asked Questions section of the PushPage website at [email or web address] Remember, PushPage is NOT to be used for urgent needs. For medical emergencies, dial 911. Now available from your iPhone and Android! Please provide this summary of care documentation to your next provider. Your primary care clinician is listed as Davey Wilson. If you have any questions after today's visit, please call [phone].

## 2018-03-20 ENCOUNTER — HOSPITAL ENCOUNTER (OUTPATIENT)
Dept: MAMMOGRAPHY | Age: 53
Discharge: HOME | End: 2018-03-20
Attending: FAMILY MEDICINE
Payer: COMMERCIAL

## 2018-03-20 DIAGNOSIS — Z12.31 ENCOUNTER FOR SCREENING MAMMOGRAM FOR MALIGNANT NEOPLASM OF BREAST: ICD-10-CM

## 2018-03-20 PROCEDURE — 77067 SCR MAMMO BI INCL CAD: CPT

## 2018-04-09 ENCOUNTER — OFFICE VISIT (OUTPATIENT)
Dept: FAMILY MEDICINE CLINIC | Age: 53
End: 2018-04-09

## 2018-04-09 VITALS
SYSTOLIC BLOOD PRESSURE: 132 MMHG | WEIGHT: 187 LBS | RESPIRATION RATE: 18 BRPM | BODY MASS INDEX: 34.41 KG/M2 | HEART RATE: 63 BPM | HEIGHT: 62 IN | DIASTOLIC BLOOD PRESSURE: 72 MMHG | TEMPERATURE: 97.8 F

## 2018-04-09 DIAGNOSIS — I10 ESSENTIAL HYPERTENSION: Primary | ICD-10-CM

## 2018-04-09 NOTE — PROGRESS NOTES
Chief Complaint   Patient presents with    Blood Pressure Check     5 mo f/u     1. Have you been to the ER, urgent care clinic since your last visit? No   Hospitalized since your last visit? No    2. Have you seen or consulted any other health care providers outside of the [address] [place] [address] since your last visit? Include any pap smears or colon screening. No     There are no preventive care reminders to display for this patient.

## 2018-04-09 NOTE — PROGRESS NOTES
HISTORY OF PRESENT ILLNESS  Micaela Crum is a 52 y.o. female. Blood Pressure Check   This is a chronic problem. The current episode started more than 1 week ago. The problem occurs constantly. The problem has been gradually worsening. Pertinent negatives include no chest pain, no abdominal pain, no headaches and no shortness of breath. Nothing aggravates the symptoms. The symptoms are relieved by medications. Treatments tried: Toprol XL. The treatment provided moderate relief. Review of Systems   Constitutional: Negative for weight loss. No weight gain   Eyes: Negative for blurred vision. Respiratory: Negative for shortness of breath. Cardiovascular: Negative for chest pain and leg swelling. Gastrointestinal: Negative for abdominal pain. Neurological: Negative for dizziness, sensory change, speech change, focal weakness and headaches. Visit Vitals    /79    Pulse 63    Temp 97.8 °F (36.6 °C) (Oral)    Resp 18    Ht 5' 2\" (1.575 m)    Wt 187 lb (84.8 kg)    BMI 34.2 kg/m2     BP Readings from Last 3 Encounters:   04/09/18 144/79   10/31/17 133/83   06/30/17 123/58     Physical Exam   Constitutional: She is oriented to person, place, and time. She appears well-developed and well-nourished. No distress. Cardiovascular: Normal rate, regular rhythm and normal heart sounds. Exam reveals no gallop and no friction rub. No murmur heard. Pulmonary/Chest: Effort normal and breath sounds normal. No respiratory distress. She has no wheezes. She has no rales. Musculoskeletal: She exhibits no edema. Neurological: She is alert and oriented to person, place, and time. Skin: Skin is warm and dry. She is not diaphoretic. Nursing note and vitals reviewed. ASSESSMENT and PLAN    ICD-10-CM ICD-9-CM    1. Essential hypertension Q62 933.2 METABOLIC PANEL, BASIC        Blood pressure elevated today  Labs per orders.   Continue current plans    Follow-up Disposition:  Return in about 7 months (around 11/9/2018) for physical, check blood pressure. Reviewed plan of care. Patient has provided input and agrees with goals.

## 2018-04-09 NOTE — MR AVS SNAPSHOT
[address] 
[phone] Patient: Bere Yeager MRN: [id number] Visit Information Date & Time Provider Department Dept. Phone Encounter #  
 2018  3:45 PM Vargas BentonNick 34 [id number] Follow-up Instructions Return in about 6 months (around 10/9/2018) for blood pressure. Upcoming Health Maintenance Date Due  
 PAP AKA CERVICAL CYTOLOGY 10/31/2019 BREAST CANCER SCRN MAMMOGRAM 3/20/2020 DTaP/Tdap/Td series (2 - Td) 2023 COLONOSCOPY 2023 Allergies as of 2018  Review Complete On: 2018 By: Vargas Benton MD  
  
 Severity Noted Reaction Type Reactions Erythromycin  2011   Systemic Other (comments) Causes stomach pain Flagyl [Metronidazole]  2011    Nausea Only Pcn [Penicillins]  2011    Rash Current Immunizations  Reviewed on 10/31/2016 Name Date Influenza Vaccine 10/12/2017, 11/3/2014 Influenza Vaccine Split 2012, 2011 12:19 PM  
 Tdap 2013  1:52 PM  
  
 Not reviewed this visit You Were Diagnosed With   
  
 Codes Comments Essential hypertension    -  Primary ICD-10-CM: I10 
ICD-9-CM: 401.9 Vitals BP Pulse Temp Resp Height(growth percentile) Weight(growth percentile) 132/72 63 97.8 °F (36.6 °C) (Oral) 18 5' 2\" (1.575 m) 187 lb (84.8 kg) BMI OB Status Smoking Status 34.2 kg/m2 Having regular periods Never Smoker Vitals History BMI and BSA Data Body Mass Index Body Surface Area  
 34.2 kg/m 2 1.93 m 2 Preferred Pharmacy Pharmacy Name Phone [id number] & [address] [phone] Your Updated Medication List  
  
   
This list is accurate as of 18  4:24 PM.  Always use your most recent med list.  
  
  
  
  
 CALCIUM 600 + D 600-125 mg-unit Tab Generic drug:  calcium-cholecalciferol (d3) Take 600 mg by mouth two (2) times a day. ERGOCALCIFEROL (VITAMIN D2) PO Take 1,000 mg by mouth daily. metoprolol succinate 25 mg XL tablet Commonly known as:  TOPROL-XL  
take 1 tablet once daily  
  
 montelukast 10 mg tablet Commonly known as:  SINGULAIR  
take 1 tablet by mouth once daily  
  
 raNITIdine 300 mg tablet Commonly known as:  ZANTAC  
take 1 tablet by mouth once daily  
  
 tiotropium 18 mcg inhalation capsule Commonly known as:  [address]  
inhale the contents of one capsule in the handihaler once daily  
  
 vitamin E 400 unit capsule Commonly known as:  [address] Take 400 Units by mouth two (2) times a day. We Performed the Following METABOLIC PANEL, BASIC [84987 CPT(R)] Follow-up Instructions Return in about 6 months (around 10/9/2018) for blood pressure. Introducing [facility] & [facility]! Dear María Elena Cunningham: Thank you for requesting a AdBira Network account. Our records indicate that you already have an active AdBira Network account. You can access your account anytime at [email or web address] Did you know that you can access your hospital and ER discharge instructions at any time in AdBira Network? You can also review all of your test results from your hospital stay or ER visit. Additional Information If you have questions, please visit the Frequently Asked Questions section of the AdBira Network website at [email or web address] Remember, AdBira Network is NOT to be used for urgent needs. For medical emergencies, dial 911. Now available from your iPhone and Android! Please provide this summary of care documentation to your next provider. Your primary care clinician is listed as Aliya Cleveland. If you have any questions after today's visit, please call [phone].

## 2018-04-10 LAB
BUN SERPL-MCNC: 11 MG/DL (ref 6–24)
BUN/CREAT SERPL: 14 (ref 9–23)
CALCIUM SERPL-MCNC: 9.8 MG/DL (ref 8.7–10.2)
CHLORIDE SERPL-SCNC: 99 MMOL/L (ref 96–106)
CO2 SERPL-SCNC: 26 MMOL/L (ref 18–29)
CREAT SERPL-MCNC: 0.8 MG/DL (ref 0.57–1)
GFR SERPLBLD CREATININE-BSD FMLA CKD-EPI: 83 ML/MIN/1.73
GFR SERPLBLD CREATININE-BSD FMLA CKD-EPI: 96 ML/MIN/1.73
GLUCOSE SERPL-MCNC: 86 MG/DL (ref 65–99)
POTASSIUM SERPL-SCNC: 4.6 MMOL/L (ref 3.5–5.2)
SODIUM SERPL-SCNC: 142 MMOL/L (ref 134–144)

## 2018-04-13 RX ORDER — MONTELUKAST SODIUM 10 MG/1
TABLET ORAL
Qty: 30 TAB | Refills: 11 | Status: SHIPPED | OUTPATIENT
Start: 2018-04-13 | End: 2019-04-05 | Stop reason: SDUPTHER

## 2018-04-13 RX ORDER — TIOTROPIUM BROMIDE 18 UG/1
CAPSULE ORAL; RESPIRATORY (INHALATION)
Qty: 30 CAP | Refills: 11 | Status: SHIPPED | OUTPATIENT
Start: 2018-04-13 | End: 2019-04-05 | Stop reason: SDUPTHER

## 2018-05-19 RX ORDER — METOPROLOL SUCCINATE 25 MG/1
TABLET, EXTENDED RELEASE ORAL
Qty: 30 TAB | Refills: 11 | Status: SHIPPED | OUTPATIENT
Start: 2018-05-19 | End: 2019-04-30 | Stop reason: SDUPTHER

## 2018-07-05 DIAGNOSIS — K21.9 GASTROESOPHAGEAL REFLUX DISEASE, ESOPHAGITIS PRESENCE NOT SPECIFIED: ICD-10-CM

## 2018-07-07 RX ORDER — RANITIDINE 300 MG/1
TABLET ORAL
Qty: 30 TAB | Refills: 8 | Status: SHIPPED | OUTPATIENT
Start: 2018-07-07 | End: 2019-04-05 | Stop reason: SDUPTHER

## 2018-08-07 ENCOUNTER — OFFICE VISIT (OUTPATIENT)
Dept: FAMILY MEDICINE CLINIC | Age: 53
End: 2018-08-07

## 2018-08-07 VITALS
RESPIRATION RATE: 12 BRPM | TEMPERATURE: 98.1 F | HEIGHT: 62 IN | SYSTOLIC BLOOD PRESSURE: 130 MMHG | WEIGHT: 188.6 LBS | HEART RATE: 63 BPM | BODY MASS INDEX: 34.71 KG/M2 | DIASTOLIC BLOOD PRESSURE: 80 MMHG | OXYGEN SATURATION: 98 %

## 2018-08-07 DIAGNOSIS — G44.209 MUSCLE CONTRACTION HEADACHE: ICD-10-CM

## 2018-08-07 DIAGNOSIS — I10 ESSENTIAL HYPERTENSION: Primary | ICD-10-CM

## 2018-08-07 NOTE — MR AVS SNAPSHOT
[address] 
[phone] Patient: John Crouch MRN: [id number] Visit Information Date & Time Provider Department Dept. Phone Encounter #  
 8/7/2018 10:30 AM Danisha Almeida [id number] Your Appointments 11/1/2018  8:15 AM  
Complete Physical with Danisha Almeida MD  
[address] [place] [address]) Appt Note: CPE  
 700 [facility] 100[address] [facility]  
[phone]  
  
   
 [address] [facility] [address] [address] Upcoming Health Maintenance Date Due Influenza Age 5 to Adult 8/1/2018 PAP AKA CERVICAL CYTOLOGY 10/31/2019 BREAST CANCER SCRN MAMMOGRAM 3/20/2020 DTaP/Tdap/Td series (2 - Td) 7/21/2023 COLONOSCOPY 8/9/2023 Allergies as of 8/7/2018  Review Complete On: 8/7/2018 By: Danisha Almeida MD  
  
 Severity Noted Reaction Type Reactions Erythromycin  09/27/2011   Systemic Other (comments) Causes stomach pain Flagyl [Metronidazole]  09/28/2011    Nausea Only Pcn [Penicillins]  08/08/2011    Rash Current Immunizations  Reviewed on 10/31/2016 Name Date Influenza Vaccine 10/12/2017, 11/3/2014 Influenza Vaccine Split 11/27/2012, 9/27/2011 12:19 PM  
 Tdap 7/21/2013  1:52 PM  
  
 Not reviewed this visit You Were Diagnosed With   
  
 Codes Comments Essential hypertension    -  Primary ICD-10-CM: I10 
ICD-9-CM: 401.9 Muscle contraction headache     ICD-10-CM: G44.209 ICD-9-CM: 307.81 Vitals BP Pulse Temp Resp Height(growth percentile) Weight(growth percentile) 130/80 (BP 1 Location: Left arm, BP Patient Position: Sitting) 63 98.1 °F (36.7 °C) (Oral) 12 5' 2\" (1.575 m) 188 lb 9.6 oz (85.5 kg) SpO2 BMI OB Status Smoking Status 98% 34.5 kg/m2 Having regular periods Never Smoker Vitals History BMI and BSA Data Body Mass Index Body Surface Area 34.5 kg/m 2 1.93 m 2 Preferred Pharmacy Pharmacy Name Phone [phone] [address] [phone] Your Updated Medication List  
  
   
This list is accurate as of 8/7/18 11:26 AM.  Always use your most recent med list.  
  
  
  
  
 CALCIUM 600 + D 600-125 mg-unit Tab Generic drug:  calcium-cholecalciferol (d3) Take 600 mg by mouth two (2) times a day. ERGOCALCIFEROL (VITAMIN D2) PO Take 1,000 mg by mouth daily. metoprolol succinate 25 mg XL tablet Commonly known as:  TOPROL-XL  
take 1 tablet by mouth once daily  
  
 montelukast 10 mg tablet Commonly known as:  SINGULAIR  
take 1 tablet by mouth once daily  
  
 raNITIdine 300 mg tablet Commonly known as:  ZANTAC  
take 1 tablet by mouth once daily SPIRIVA WITH HANDIHALER 18 mcg inhalation capsule Generic drug:  tiotropium  
inhale the contents of one capsule in the handihaler once daily  
  
 vitamin E 400 unit capsule Commonly known as:  [address] Take 400 Units by mouth two (2) times a day. Patient Instructions Tension Headache: Care Instructions Your Care Instructions Most headaches are tension headaches. These headaches tend to happen again, especially if you are under stress. A tension headache may cause pain or a feeling of pressure all over your head. You probably can't pinpoint the center of the pain. If you keep getting tension headaches, the best thing you can do to limit them is to find out what is causing them and then make changes in those areas. Follow-up care is a key part of your treatment and safety. Be sure to make and go to all appointments, and call your doctor if you are having problems. It's also a good idea to know your test results and keep a list of the medicines you take. How can you care for yourself at home? · Rest in a quiet, dark room with a cool cloth on your forehead until your headache is gone. Close your eyes, and try to relax or go to sleep. Don't watch TV or read. Avoid using the computer. · Use a warm, moist towel or a heating pad set on low to relax tight shoulder and neck muscles. · Have someone gently massage your neck and shoulders. · Take pain medicines exactly as directed. ¨ If the doctor gave you a prescription medicine for pain, take it as prescribed. ¨ If you are not taking a prescription pain medicine, ask your doctor if you can take an over-the-counter medicine. · Be careful not to take pain medicine more often than the instructions allow, because you may get worse or more frequent headaches when the medicine wears off. · If you get another tension headache, stop what you are doing and sit quietly for a moment. Close your eyes and breathe slowly. Try to relax your head and neck muscles. · Do not ignore new symptoms that occur with a headache, such as fever, weakness or numbness, vision changes, or confusion. These may be signs of a more serious problem. To help prevent headaches · Keep a headache diary so you can figure out what triggers your headaches. Avoiding triggers may help you prevent headaches. Record when each headache began, how long it lasted, and what the pain was like (throbbing, aching, stabbing, or dull). List anything that may have triggered the headache, such as being physically or emotionally stressed or being anxious or depressed. Other possible triggers are hunger, anger, fatigue, poor posture, and muscle strain. · Find healthy ways to deal with stress. Headaches are most common during or right after stressful times. Take time to relax before and after you do something that has caused a headache in the past. 
· Exercise daily to relieve stress. Relaxation exercises may help reduce tension. · Get plenty of sleep. · Eat regularly and well. Long periods without food can trigger a headache. · Treat yourself to a massage. Some people find that massages are very helpful in relieving tension. · Try to keep your muscles relaxed by keeping good posture. Check your jaw, face, neck, and shoulder muscles for tension, and try to relax them. When sitting at a desk, change positions often, and stretch for 30 seconds each hour. · Reduce eyestrain from computers by blinking frequently and looking away from the computer screen every so often. Make sure you have proper eyewear and that your monitor is set up properly, about an arm's length away. When should you call for help? Call 911 anytime you think you may need emergency care. For example, call if: 
  · You have signs of a stroke. These may include: 
¨ Sudden numbness, paralysis, or weakness in your face, arm, or leg, especially on only one side of your body. ¨ Sudden vision changes. ¨ Sudden trouble speaking. ¨ Sudden confusion or trouble understanding simple statements. ¨ Sudden problems with walking or balance. ¨ A sudden, severe headache that is different from past headaches.  
 Call your doctor now or seek immediate medical care if: 
  · You have new or worse nausea and vomiting.  
  · You have a new or higher fever.  
  · Your headache gets much worse.  
 Watch closely for changes in your health, and be sure to contact your doctor if: 
  · You are not getting better after 2 days (48 hours). Where can you learn more? Go to http://neal-jose.info/. Enter 13 12 16 in the search box to learn more about \"Tension Headache: Care Instructions. \" Current as of: October 9, 2017 Content Version: 11.7 © 7717-1660 [employer]. Care instructions adapted under license by [employer] (which disclaims liability or warranty for this information).  If you have questions about a medical condition or this instruction, always ask your healthcare professional. [address] any warranty or liability for your use of this information. Introducing [facility]! Dear Bright Meyer: Thank you for requesting a [employer] account. Our records indicate that you already have an active [employer] account. You can access your account anytime at [email or web address] Did you know that you can access your hospital and ER discharge instructions at any time in [employer]? You can also review all of your test results from your hospital stay or ER visit. Additional Information If you have questions, please visit the Frequently Asked Questions section of the [employer] website at [email or web address] Remember, [employer] is NOT to be used for urgent needs. For medical emergencies, dial 911. Now available from your iPhone and Android! Please provide this summary of care documentation to your next provider. Your primary care clinician is listed as Damaris Brower. If you have any questions after today's visit, please call [phone].

## 2018-08-07 NOTE — PROGRESS NOTES
1. Have you been to the ER, urgent care clinic since your last visit? Hospitalized since your last visit? No    2. Have you seen or consulted any other health care providers outside of the [address] [place] [address] since your last visit? Include any pap smears or colon screening. No     Chief Complaint   Patient presents with    Hypertension     53 y/o female reports to office for bp check up.      Headache

## 2018-08-07 NOTE — PATIENT INSTRUCTIONS
Tension Headache: Care Instructions  Your Care Instructions  Most headaches are tension headaches. These headaches tend to happen again, especially if you are under stress. A tension headache may cause pain or a feeling of pressure all over your head. You probably can't pinpoint the center of the pain. If you keep getting tension headaches, the best thing you can do to limit them is to find out what is causing them and then make changes in those areas. Follow-up care is a key part of your treatment and safety. Be sure to make and go to all appointments, and call your doctor if you are having problems. It's also a good idea to know your test results and keep a list of the medicines you take. How can you care for yourself at home? · Rest in a quiet, dark room with a cool cloth on your forehead until your headache is gone. Close your eyes, and try to relax or go to sleep. Don't watch TV or read. Avoid using the computer. · Use a warm, moist towel or a heating pad set on low to relax tight shoulder and neck muscles. · Have someone gently massage your neck and shoulders. · Take pain medicines exactly as directed. ¨ If the doctor gave you a prescription medicine for pain, take it as prescribed. ¨ If you are not taking a prescription pain medicine, ask your doctor if you can take an over-the-counter medicine. · Be careful not to take pain medicine more often than the instructions allow, because you may get worse or more frequent headaches when the medicine wears off. · If you get another tension headache, stop what you are doing and sit quietly for a moment. Close your eyes and breathe slowly. Try to relax your head and neck muscles. · Do not ignore new symptoms that occur with a headache, such as fever, weakness or numbness, vision changes, or confusion. These may be signs of a more serious problem. To help prevent headaches  · Keep a headache diary so you can figure out what triggers your headaches. Avoiding triggers may help you prevent headaches. Record when each headache began, how long it lasted, and what the pain was like (throbbing, aching, stabbing, or dull). List anything that may have triggered the headache, such as being physically or emotionally stressed or being anxious or depressed. Other possible triggers are hunger, anger, fatigue, poor posture, and muscle strain. · Find healthy ways to deal with stress. Headaches are most common during or right after stressful times. Take time to relax before and after you do something that has caused a headache in the past.  · Exercise daily to relieve stress. Relaxation exercises may help reduce tension. · Get plenty of sleep. · Eat regularly and well. Long periods without food can trigger a headache. · Treat yourself to a massage. Some people find that massages are very helpful in relieving tension. · Try to keep your muscles relaxed by keeping good posture. Check your jaw, face, neck, and shoulder muscles for tension, and try to relax them. When sitting at a desk, change positions often, and stretch for 30 seconds each hour. · Reduce eyestrain from computers by blinking frequently and looking away from the computer screen every so often. Make sure you have proper eyewear and that your monitor is set up properly, about an arm's length away. When should you call for help? Call 911 anytime you think you may need emergency care. For example, call if:    · You have signs of a stroke. These may include:  ¨ Sudden numbness, paralysis, or weakness in your face, arm, or leg, especially on only one side of your body. ¨ Sudden vision changes. ¨ Sudden trouble speaking. ¨ Sudden confusion or trouble understanding simple statements. ¨ Sudden problems with walking or balance.   ¨ A sudden, severe headache that is different from past headaches.    Call your doctor now or seek immediate medical care if:    · You have new or worse nausea and vomiting.     · You have a new or higher fever.     · Your headache gets much worse.    Watch closely for changes in your health, and be sure to contact your doctor if:    · You are not getting better after 2 days (48 hours). Where can you learn more? Go to http://neal-jose.info/. Enter 84 17 52 in the search box to learn more about \"Tension Headache: Care Instructions. \"  Current as of: October 9, 2017  Content Version: 11.7  © 4625-3265 [employer]. Care instructions adapted under license by [employer] (which disclaims liability or warranty for this information). If you have questions about a medical condition or this instruction, always ask your healthcare professional. [address] any warranty or liability for your use of this information.

## 2018-08-07 NOTE — PROGRESS NOTES
HISTORY OF PRESENT ILLNESS  Adan Briones is a 52 y.o. female. HPI Comments: Adan Briones is here for her HTN. Evidently, she went to the health club and her blood pressure was high and she is concerned. She has had no other blood pressures checked. Also, she has been having headaches for the past 2 days. Yesterday it was mild and lasted all day. She went to sleep with relief. Today it has been present for several hours, again, mild. She wonders if it is from being tired because she went to bed late last night. The headaches are located throughout the entire head and are pressure like. They are unchanged. No N/V, stroke symptoms or visual disturbances. Denies neck pain. She has not tried anything for it. Hypertension   The history is provided by the patient. This is a chronic problem. The current episode started more than 1 week ago. The problem occurs constantly. The problem has not changed since onset. Associated symptoms include headaches. Pertinent negatives include no chest pain, no abdominal pain and no shortness of breath. Nothing aggravates the symptoms. The symptoms are relieved by medications. Treatments tried: Toprol XL. The treatment provided significant relief. Review of Systems   Constitutional: Negative for weight loss. No weight gain   HENT:        No phonophobia   Eyes: Negative for blurred vision, double vision and photophobia. Respiratory: Negative for shortness of breath. Cardiovascular: Negative for chest pain and leg swelling. Gastrointestinal: Negative for abdominal pain, nausea and vomiting. Musculoskeletal: Negative for neck pain. Neurological: Positive for headaches. Negative for dizziness, sensory change, speech change and focal weakness.        Visit Vitals    /80 (BP 1 Location: Left arm, BP Patient Position: Sitting)    Pulse 63    Temp 98.1 °F (36.7 °C) (Oral)    Resp 12    Ht 5' 2\" (1.575 m)    Wt 188 lb 9.6 oz (85.5 kg)    SpO2 98%  BMI 34.5 kg/m2     BP Readings from Last 3 Encounters:   08/07/18 130/80   04/09/18 132/72   10/31/17 133/83     Physical Exam   Constitutional: She is oriented to person, place, and time. She appears well-developed and well-nourished. No distress. Eyes: EOM are normal. Pupils are equal, round, and reactive to light. Fundoscopic exam:       The right eye shows no papilledema. The left eye shows no papilledema. Cardiovascular: Normal rate, regular rhythm and normal heart sounds. Exam reveals no gallop and no friction rub. No murmur heard. Pulmonary/Chest: Effort normal and breath sounds normal. No respiratory distress. She has no wheezes. She has no rales. Musculoskeletal: She exhibits no edema. Neurological: She is alert and oriented to person, place, and time. She has normal reflexes. Gait normal. She displays no Babinski's sign on the right side. She displays no Babinski's sign on the left side. Facies symmetric   Skin: Skin is warm and dry. She is not diaphoretic. Nursing note and vitals reviewed. ASSESSMENT and PLAN    ICD-10-CM ICD-9-CM    1. Essential hypertension I10 401.9    2. Muscle contraction headache G44.209 307.81         Blood pressure controlled  Headache, not due to blood pressure, possibly due to lack of sleep  Continue current plans. Advil or Tylenol prn    Follow-up Disposition:  Return if headaches not better in 2 days. Reviewed plan of care. Patient has provided input and agrees with goals.

## 2018-10-31 NOTE — PROGRESS NOTES
Subjective: 
Henry Patton is a 52 y.o. female here for annual physical exam.  Her last PAP was in 2016. No history of abnormal PAPs. Health Habits. Lifestyle: Occupation:   Household members:  1, patient Last dental appointment:   Over the summer Last eye exam:  Over the summer Uses seatbelts regularly :  yes Getting regular exercise:  no 
Last colonoscopy:   2013 Last mammogram:  3/12/18 Patient Active Problem List  
Diagnosis Code  Pulmonary sarcoidosis ([facility] [address] D86.0  Obesity E66.9  
 Essential hypertension I10  
 Palpitations R00.2  GERD (gastroesophageal reflux disease) K21.9  Celiac disease K90.0  
 DEL ANGEL (nonalcoholic steatohepatitis) K75.81  
 Hypercholesterolemia E78.00 Past Medical History:  
Diagnosis Date  Celiac disease 10/29/2015  Contact dermatitis and other eczema, due to unspecified cause  Essential hypertension 2/27/2013  GERD (gastroesophageal reflux disease) 11/3/2014  
 HTN (hypertension) 2/27/2013  Hypercholesterolemia 10/31/2017  
 DEL ANGEL (nonalcoholic steatohepatitis) 10/29/2015  Rosacea 8/31/2011  Sarcoidosis 2007  Vitamin D deficiency 8/31/2011 Past Surgical History:  
Procedure Laterality Date  HX OTHER SURGICAL Biospy of lymph nodes chest  
  
Family History Problem Relation Age of Onset  Cancer Father  Hypertension Father  Breast Cancer Maternal Grandmother  Diabetes Maternal Grandmother  Stroke Maternal Grandmother  Cancer Paternal Grandmother  Cancer Paternal Grandfather  Hypertension Mother Social History Tobacco Use  Smoking status: Never Smoker  Smokeless tobacco: Never Used Substance Use Topics  Alcohol use: No  
  Alcohol/week: 0.0 oz Allergies Allergen Reactions  Erythromycin Other (comments) Causes stomach pain  Flagyl [Metronidazole] Nausea Only  Pcn [Penicillins] Rash Current Outpatient Medications Medication Sig Dispense Refill  raNITIdine (ZANTAC) 300 mg tablet take 1 tablet by mouth once daily 30 Tab 8  
 metoprolol succinate (TOPROL-XL) 25 mg XL tablet take 1 tablet by mouth once daily 30 Tab 11  
 montelukast (SINGULAIR) 10 mg tablet take 1 tablet by mouth once daily 30 Tab 11  
 SPIRIVA WITH HANDIHALER 18 mcg inhalation capsule inhale the contents of one capsule in the handihaler once daily 30 Cap 11  
 ERGOCALCIFEROL, VITAMIN D2, PO Take 1,000 mg by mouth daily.  vitamin E (AQUA GEMS) 400 unit capsule Take 400 Units by mouth two (2) times a day.  calcium-cholecalciferol, d3, (CALCIUM 600 + D) 600-125 mg-unit Tab Take 600 mg by mouth two (2) times a day. Review of Systems A comprehensive review of systems was negative except for: Allergic/Immunologic: positive for hay fever Objective: There were no vitals taken for this visit. Physical Examination:  
General appearance - alert, well appearing, and in no distress Mental status - alert, oriented to person, place, and time, normal mood, behavior, speech, dress, motor activity, and thought processes Eyes - pupils equal and reactive, extraocular eye movements intact, sclera anicteric Ears - bilateral TM's and external ear canals normal 
Nose - normal and patent, no erythema, discharge or polyps Mouth - mucous membranes moist, pharynx normal without lesions and dental hygiene good Neck - supple, no significant adenopathy, carotids upstroke normal bilaterally, no bruits, thyroid exam: thyroid is normal in size without nodules or tenderness Lymphatics - no palpable lymphadenopathy, no hepatosplenomegaly Chest - clear to auscultation, no wheezes, rales or rhonchi, symmetric air entry Heart - normal rate, regular rhythm, normal S1, S2, no murmurs, rubs, clicks or gallops Abdomen - soft, nontender, nondistended, no masses or organomegaly 
bowel sounds normal 
 Breasts - breasts appear normal, no suspicious masses, no skin or nipple changes or axillary nodes Pelvic - examination not indicated Rectal - Kit for FOBT testing given to patient Neurological - alert, oriented, normal speech, no focal findings or movement disorder noted Musculoskeletal - normal gait Extremities - peripheral pulses normal, no pedal edema, no clubbing or cyanosis Skin - normal coloration and turgor, no rashes, no suspicious skin lesions noted Assessment/Plan: ICD-10-CM ICD-9-CM 1. Routine general medical examination at a health care facility Z00.00 V70.0 CBC WITH AUTOMATED DIFF 2. Essential hypertension D17 039.2 METABOLIC PANEL, COMPREHENSIVE 3. Hypercholesterolemia E78.00 272.0 LIPID PANEL  
   METABOLIC PANEL, COMPREHENSIVE 4. Screen for colon cancer Z12.11 V76.51 OCCULT BLOOD IMMUNOASSAY,DIAGNOSTIC  
5. Obesity without serious comorbidity, unspecified classification, unspecified obesity type E66.9 278.00 Breast awareness Labs per orders. I have reviewed/discussed the above normal BMI with the patient. I have recommended the following interventions: dietary management education, guidance, and counseling and encourage exercise . Marcia Romero Follow-up Disposition: 
Return in about 6 months (around 5/1/2019) for blood pressure, check weight. Reviewed plan of care. Patient has provided input and agrees with goals.

## 2018-11-01 ENCOUNTER — OFFICE VISIT (OUTPATIENT)
Dept: FAMILY MEDICINE CLINIC | Age: 53
End: 2018-11-01

## 2018-11-01 VITALS
TEMPERATURE: 98 F | HEIGHT: 62 IN | SYSTOLIC BLOOD PRESSURE: 136 MMHG | HEART RATE: 68 BPM | DIASTOLIC BLOOD PRESSURE: 76 MMHG | WEIGHT: 184 LBS | RESPIRATION RATE: 18 BRPM | BODY MASS INDEX: 33.86 KG/M2

## 2018-11-01 DIAGNOSIS — Z12.11 SCREEN FOR COLON CANCER: ICD-10-CM

## 2018-11-01 DIAGNOSIS — E66.9 OBESITY WITHOUT SERIOUS COMORBIDITY, UNSPECIFIED CLASSIFICATION, UNSPECIFIED OBESITY TYPE: ICD-10-CM

## 2018-11-01 DIAGNOSIS — Z00.00 ROUTINE GENERAL MEDICAL EXAMINATION AT A HEALTH CARE FACILITY: Primary | ICD-10-CM

## 2018-11-01 DIAGNOSIS — E78.00 HYPERCHOLESTEROLEMIA: ICD-10-CM

## 2018-11-01 DIAGNOSIS — I10 ESSENTIAL HYPERTENSION: ICD-10-CM

## 2018-11-01 NOTE — PROGRESS NOTES
Chief Complaint Patient presents with  Complete Physical  
 
1. Have you been to the ER, urgent care clinic since your last visit? Hospitalized since your last visit? No  
 
2. Have you seen or consulted any other health care providers outside of the [address] [place] [address] since your last visit? Include any pap smears or colon screening.  No

## 2018-11-02 LAB
ALBUMIN SERPL-MCNC: 4.2 G/DL (ref 3.5–5.5)
ALBUMIN/GLOB SERPL: 1.3 {RATIO} (ref 1.2–2.2)
ALP SERPL-CCNC: 125 IU/L (ref 39–117)
ALT SERPL-CCNC: 32 IU/L (ref 0–32)
AST SERPL-CCNC: 21 IU/L (ref 0–40)
BASOPHILS # BLD AUTO: 0.1 X10E3/UL (ref 0–0.2)
BASOPHILS NFR BLD AUTO: 1 %
BILIRUB SERPL-MCNC: 0.4 MG/DL (ref 0–1.2)
BUN SERPL-MCNC: 15 MG/DL (ref 6–24)
BUN/CREAT SERPL: 16 (ref 9–23)
CALCIUM SERPL-MCNC: 9.8 MG/DL (ref 8.7–10.2)
CHLORIDE SERPL-SCNC: 100 MMOL/L (ref 96–106)
CHOLEST SERPL-MCNC: 164 MG/DL (ref 100–199)
CO2 SERPL-SCNC: 24 MMOL/L (ref 20–29)
CREAT SERPL-MCNC: 0.96 MG/DL (ref 0.57–1)
EOSINOPHIL # BLD AUTO: 0.4 X10E3/UL (ref 0–0.4)
EOSINOPHIL NFR BLD AUTO: 6 %
ERYTHROCYTE [DISTWIDTH] IN BLOOD BY AUTOMATED COUNT: 13.5 % (ref 12.3–15.4)
GLOBULIN SER CALC-MCNC: 3.3 G/DL (ref 1.5–4.5)
GLUCOSE SERPL-MCNC: 84 MG/DL (ref 65–99)
HCT VFR BLD AUTO: 44.8 % (ref 34–46.6)
HDLC SERPL-MCNC: 40 MG/DL
HGB BLD-MCNC: 14.8 G/DL (ref 11.1–15.9)
IMM GRANULOCYTES # BLD: 0 X10E3/UL (ref 0–0.1)
IMM GRANULOCYTES NFR BLD: 0 %
INTERPRETATION, 910389: NORMAL
LDLC SERPL CALC-MCNC: 98 MG/DL (ref 0–99)
LYMPHOCYTES # BLD AUTO: 1.7 X10E3/UL (ref 0.7–3.1)
LYMPHOCYTES NFR BLD AUTO: 26 %
MCH RBC QN AUTO: 30.1 PG (ref 26.6–33)
MCHC RBC AUTO-ENTMCNC: 33 G/DL (ref 31.5–35.7)
MCV RBC AUTO: 91 FL (ref 79–97)
MONOCYTES # BLD AUTO: 0.4 X10E3/UL (ref 0.1–0.9)
MONOCYTES NFR BLD AUTO: 7 %
NEUTROPHILS # BLD AUTO: 4 X10E3/UL (ref 1.4–7)
NEUTROPHILS NFR BLD AUTO: 60 %
PLATELET # BLD AUTO: 254 X10E3/UL (ref 150–379)
POTASSIUM SERPL-SCNC: 4.7 MMOL/L (ref 3.5–5.2)
PROT SERPL-MCNC: 7.5 G/DL (ref 6–8.5)
RBC # BLD AUTO: 4.91 X10E6/UL (ref 3.77–5.28)
SODIUM SERPL-SCNC: 141 MMOL/L (ref 134–144)
TRIGL SERPL-MCNC: 132 MG/DL (ref 0–149)
VLDLC SERPL CALC-MCNC: 26 MG/DL (ref 5–40)
WBC # BLD AUTO: 6.6 X10E3/UL (ref 3.4–10.8)

## 2018-11-11 LAB
HEMOCCULT STL QL IA: NEGATIVE
SPECIMEN STATUS REPORT, ROLRST: NORMAL

## 2019-04-05 DIAGNOSIS — K21.9 GASTROESOPHAGEAL REFLUX DISEASE, ESOPHAGITIS PRESENCE NOT SPECIFIED: ICD-10-CM

## 2019-04-05 RX ORDER — RANITIDINE 300 MG/1
TABLET ORAL
Qty: 30 TAB | Refills: 11 | Status: SHIPPED | OUTPATIENT
Start: 2019-04-05 | End: 2019-11-19 | Stop reason: ALTCHOICE

## 2019-04-05 RX ORDER — MONTELUKAST SODIUM 10 MG/1
TABLET ORAL
Qty: 30 TAB | Refills: 11 | Status: SHIPPED | OUTPATIENT
Start: 2019-04-05 | End: 2020-03-13

## 2019-04-05 RX ORDER — TIOTROPIUM BROMIDE 18 UG/1
CAPSULE ORAL; RESPIRATORY (INHALATION)
Qty: 30 CAP | Refills: 11 | Status: SHIPPED | OUTPATIENT
Start: 2019-04-05 | End: 2020-03-13

## 2019-04-30 ENCOUNTER — OFFICE VISIT (OUTPATIENT)
Dept: FAMILY MEDICINE CLINIC | Age: 54
End: 2019-04-30

## 2019-04-30 VITALS
RESPIRATION RATE: 18 BRPM | TEMPERATURE: 96.5 F | HEART RATE: 67 BPM | BODY MASS INDEX: 33.86 KG/M2 | DIASTOLIC BLOOD PRESSURE: 77 MMHG | SYSTOLIC BLOOD PRESSURE: 137 MMHG | WEIGHT: 184 LBS | HEIGHT: 62 IN

## 2019-04-30 DIAGNOSIS — I10 ESSENTIAL HYPERTENSION: Primary | ICD-10-CM

## 2019-04-30 DIAGNOSIS — Z12.39 SCREENING FOR BREAST CANCER: ICD-10-CM

## 2019-04-30 RX ORDER — METOPROLOL SUCCINATE 25 MG/1
TABLET, EXTENDED RELEASE ORAL
Qty: 30 TAB | Refills: 11 | Status: SHIPPED | OUTPATIENT
Start: 2019-04-30 | End: 2020-04-13

## 2019-04-30 NOTE — PROGRESS NOTES
HISTORY OF PRESENT ILLNESS Pk Morse is a 62 y.o. female. Hypertension The history is provided by the patient. This is a chronic problem. The current episode started more than 1 week ago. The problem occurs constantly. The problem has not changed since onset. Associated symptoms include headaches. Pertinent negatives include no chest pain, no abdominal pain and no shortness of breath. Associated symptoms comments: Mild, allergy related headache today. Nothing aggravates the symptoms. The symptoms are relieved by medications. Treatments tried: Toprol XL. The treatment provided significant relief. Review of Systems Constitutional: Negative for weight loss. No weight gain Eyes: Negative for blurred vision. Respiratory: Negative for shortness of breath. Cardiovascular: Negative for chest pain and leg swelling. Gastrointestinal: Negative for abdominal pain. Neurological: Positive for headaches. Negative for dizziness, sensory change, speech change and focal weakness. Visit Vitals /77 Pulse 67 Temp 96.5 °F (35.8 °C) (Oral) Resp 18 Ht 5' 2\" (1.575 m) Wt 184 lb (83.5 kg) BMI 33.65 kg/m² BP Readings from Last 3 Encounters:  
11/01/18 136/76  
08/07/18 130/80  
04/09/18 132/72 Physical Exam  
Constitutional: She is oriented to person, place, and time. She appears well-developed and well-nourished. No distress. Cardiovascular: Normal rate, regular rhythm and normal heart sounds. Exam reveals no gallop and no friction rub. No murmur heard. Pulmonary/Chest: Effort normal and breath sounds normal. No respiratory distress. She has no wheezes. She has no rales. Musculoskeletal: She exhibits no edema. Neurological: She is alert and oriented to person, place, and time. Skin: Skin is warm and dry. She is not diaphoretic. Nursing note and vitals reviewed. ASSESSMENT and PLAN 
  ICD-10-CM ICD-9-CM 1. Essential hypertension P96 763.9 METABOLIC PANEL, BASIC 2. Screening for breast cancer Z12.31 V76.10 PRISCILLA MAMMO BI SCREENING INCL CAD Blood pressure controlled Labs per orders. Continue current plans. Mammogram  
 
Follow-up and Dispositions · Return in about 6 months (around 10/30/2019) for blood pressure. Reviewed plan of care. Patient has provided input and agrees with goals.

## 2019-05-01 LAB
BUN SERPL-MCNC: 9 MG/DL (ref 6–24)
BUN/CREAT SERPL: 11 (ref 9–23)
CALCIUM SERPL-MCNC: 9.6 MG/DL (ref 8.7–10.2)
CHLORIDE SERPL-SCNC: 104 MMOL/L (ref 96–106)
CO2 SERPL-SCNC: 23 MMOL/L (ref 20–29)
CREAT SERPL-MCNC: 0.85 MG/DL (ref 0.57–1)
GLUCOSE SERPL-MCNC: 78 MG/DL (ref 65–99)
POTASSIUM SERPL-SCNC: 5.1 MMOL/L (ref 3.5–5.2)
SODIUM SERPL-SCNC: 144 MMOL/L (ref 134–144)

## 2019-05-06 ENCOUNTER — HOSPITAL ENCOUNTER (OUTPATIENT)
Dept: MAMMOGRAPHY | Age: 54
Discharge: HOME | End: 2019-05-06
Attending: FAMILY MEDICINE
Payer: COMMERCIAL

## 2019-05-06 DIAGNOSIS — Z12.39 SCREENING FOR BREAST CANCER: ICD-10-CM

## 2019-05-06 PROCEDURE — 77067 SCR MAMMO BI INCL CAD: CPT

## 2019-05-16 ENCOUNTER — OFFICE VISIT (OUTPATIENT)
Dept: FAMILY MEDICINE CLINIC | Age: 54
End: 2019-05-16

## 2019-05-16 VITALS
TEMPERATURE: 98.7 F | HEIGHT: 62 IN | WEIGHT: 186 LBS | HEART RATE: 90 BPM | BODY MASS INDEX: 34.23 KG/M2 | DIASTOLIC BLOOD PRESSURE: 73 MMHG | RESPIRATION RATE: 18 BRPM | SYSTOLIC BLOOD PRESSURE: 119 MMHG

## 2019-05-16 DIAGNOSIS — L08.9 INFECTED SEBACEOUS CYST: Primary | ICD-10-CM

## 2019-05-16 DIAGNOSIS — L72.3 INFECTED SEBACEOUS CYST: Primary | ICD-10-CM

## 2019-05-16 RX ORDER — SULFAMETHOXAZOLE AND TRIMETHOPRIM 800; 160 MG/1; MG/1
1 TABLET ORAL 2 TIMES DAILY
Qty: 20 TAB | Refills: 0 | Status: SHIPPED | OUTPATIENT
Start: 2019-05-16 | End: 2019-05-20

## 2019-05-16 NOTE — PROGRESS NOTES
Chief Complaint   Patient presents with    Mass     under left breast; painful x 1 wk      1. Have you been to the ER, urgent care clinic since your last visit? Hospitalized since your last visit? No     2. Have you seen or consulted any other health care providers outside of the [address] [place] [address] since your last visit? Include any pap smears or colon screening.  No

## 2019-05-16 NOTE — LETTER
NOTIFICATION RETURN TO WORK / SCHOOL 
 
5/16/2019 4:40 PM 
 
Ms. Joshua Navarro [facility] [address] [address] To Whom It May Concern: 
 
Joshua Navarro is currently under the care of [address] [place] [address]. She will return to work/school on: 5/20/19. If there are questions or concerns please have the patient contact our office.  
 
 
 
Sincerely, 
 
 
Hattie Councilman, MD

## 2019-05-16 NOTE — PROGRESS NOTES
HISTORY OF PRESENT ILLNESS  Richard Stahl is a 62 y.o. female. Richard Stahl is here due to a painful \"bump\" inferior to the left breast for about a week. It is getting worse. There has been a small chronic \"bump\" there in the past.  It is getting more painful. Certain movements make it worse. Adjusting what she is doing makes it worse. Not drainage, but there is an odor to it. Review of Systems   Constitutional: Negative for chills and fever. Skin:        Warm, red and tender skin \"bump\"       Visit Vitals  /73   Pulse 90   Temp 98.7 °F (37.1 °C) (Oral)   Resp 18   Ht 5' 2\" (1.575 m)   Wt 186 lb (84.4 kg)   BMI 34.02 kg/m²     Physical Exam   Constitutional: She is oriented to person, place, and time. She appears well-developed and well-nourished. No distress. Neurological: She is alert and oriented to person, place, and time. Skin: She is not diaphoretic. ASSESSMENT and PLAN    ICD-10-CM ICD-9-CM    1. Infected sebaceous cyst L72.3 706.2 trimethoprim-sulfamethoxazole (BACTRIM DS) 160-800 mg per tablet    L08.9          Warm compresses  Bactrim  Avoid pressure or friction with clothing    Follow-up and Dispositions    · Return if symptoms worsen or fail to improve. Reviewed plan of care. Patient has provided input and agrees with goals.

## 2019-05-16 NOTE — PATIENT INSTRUCTIONS
Epidermoid Cyst: Care Instructions  Your Care Instructions  An epidermoid (say \"kt-zyp-FQG-fernie\") cyst is a lump just under the skin. These cysts can form when a hair follicle becomes blocked. They are common in acne and may occur on the face, neck, back, and genitals. However, they can form anywhere on the body. These cysts are not cancer and do not lead to cancer. They tend not to hurt, but they can sometimes become swollen and painful. They also may break open (rupture) and cause scarring. These cysts sometimes do not cause problems and may not need treatment. If you have a cyst that is swollen and hurts, your doctor may inject it with a medicine to help it heal. But it is more likely that a painful cyst will need to be removed. Your doctor will give you a shot of numbing medicine and cut into the cyst to drain it or remove it. This makes the symptoms go away. Follow-up care is a key part of your treatment and safety. Be sure to make and go to all appointments, and call your doctor if you are having problems. It's also a good idea to know your test results and keep a list of the medicines you take. How can you care for yourself at home? · Do not squeeze the cyst or poke it with a needle to open it. This can cause swelling, redness, and infection. · Always have a doctor look at any new lumps you get to make sure that they are not serious. When should you call for help? Watch closely for changes in your health, and be sure to contact your doctor if:    · You have a fever, redness, or swelling after you get a shot of medicine in the cyst.     · You see or feel a new lump on your skin. Where can you learn more? Go to http://neal-jose.info/. Enter W319 in the search box to learn more about \"Epidermoid Cyst: Care Instructions. \"  Current as of: April 17, 2018  Content Version: 11.9  © 9589-0773 [employer].  Care instructions adapted under license by [employer] (which disclaims liability or warranty for this information). If you have questions about a medical condition or this instruction, always ask your healthcare professional. [address] any warranty or liability for your use of this information.

## 2019-05-20 ENCOUNTER — OFFICE VISIT (OUTPATIENT)
Dept: FAMILY MEDICINE CLINIC | Age: 54
End: 2019-05-20

## 2019-05-20 VITALS
BODY MASS INDEX: 34.23 KG/M2 | HEIGHT: 62 IN | RESPIRATION RATE: 16 BRPM | TEMPERATURE: 96.5 F | DIASTOLIC BLOOD PRESSURE: 64 MMHG | SYSTOLIC BLOOD PRESSURE: 129 MMHG | WEIGHT: 186 LBS | HEART RATE: 61 BPM

## 2019-05-20 DIAGNOSIS — L72.3 INFECTED SEBACEOUS CYST: Primary | ICD-10-CM

## 2019-05-20 DIAGNOSIS — L08.9 INFECTED SEBACEOUS CYST: Primary | ICD-10-CM

## 2019-05-20 RX ORDER — TRAMADOL HYDROCHLORIDE 50 MG/1
50 TABLET ORAL
Qty: 15 TAB | Refills: 0 | Status: SHIPPED | OUTPATIENT
Start: 2019-05-20 | End: 2019-05-27

## 2019-05-20 NOTE — PROGRESS NOTES
Massiel Stover is a 62 y.o. female      Chief Complaint   Patient presents with    Other     Pain under Rigth Breast.        1. Have you been to the ER, urgent care clinic since your last visit? Hospitalized since your last visit? No    2. Have you seen or consulted any other health care providers outside of the [address] [place] [address] since your last visit? Include any pap smears or colon screening.  No

## 2019-05-20 NOTE — PROGRESS NOTES
HISTORY OF PRESENT ILLNESS  Massiel Stover is a 62 y.o. female. Massiel Stover is here for follow up on the infected sebaceous cyst I diagnosed on the 16th. It is getting bigger and more painful. Aspirin helps. She was treated with Bactrim but stopped it 2 days ago due to abdominal pain. It may have had gluten in it, which aggravated her celiac disease. The abdominal pain has since resolved. Review of Systems   Constitutional: Negative for chills and fever. Skin:        cyst       Visit Vitals  /64 (BP 1 Location: Right arm, BP Patient Position: Sitting)   Pulse 61   Temp 96.5 °F (35.8 °C) (Oral)   Resp 16   Ht 5' 2\" (1.575 m)   Wt 186 lb (84.4 kg)   BMI 34.02 kg/m²     Physical Exam   Constitutional: She is oriented to person, place, and time. She appears well-developed and well-nourished. No distress. Neurological: She is alert and oriented to person, place, and time. Skin: She is not diaphoretic. ASSESSMENT and PLAN    ICD-10-CM ICD-9-CM    1. Infected sebaceous cyst L72.3 706.2 REFERRAL TO GENERAL SURGERY    L08.9  traMADol (ULTRAM) 50 mg tablet        Worsening infected sebaceous cyst, did not tolerate Bactrim  Surgery referral  Tramadol prn   web site reviewed. No NSAIDs    Follow-up and Dispositions    · Return if symptoms worsen or fail to improve. Reviewed plan of care. Patient has provided input and agrees with goals.

## 2019-10-03 ENCOUNTER — TELEPHONE (OUTPATIENT)
Dept: FAMILY MEDICINE CLINIC | Age: 54
End: 2019-10-03

## 2019-10-03 NOTE — TELEPHONE ENCOUNTER
Pt called stating she's concerned about taking Zantac after recent news showing it may be unsafe and wants call back to advise if Dr. Janiya Olson wants her to continue taking it or if she can be switched to a different medication. Please advise at [phone].  Ldm

## 2019-10-05 NOTE — TELEPHONE ENCOUNTER
She needs to check with her pharmacist to see if her medication is in the lot that is being recalled.

## 2019-10-07 NOTE — TELEPHONE ENCOUNTER
Called and spoke with pt, and she has been advised and states understanding of Dr. Adeola Duran recommendation and agrees with plan.

## 2019-11-01 ENCOUNTER — OFFICE VISIT (OUTPATIENT)
Dept: FAMILY MEDICINE CLINIC | Age: 54
End: 2019-11-01

## 2019-11-01 VITALS
TEMPERATURE: 96.4 F | BODY MASS INDEX: 33.86 KG/M2 | WEIGHT: 184 LBS | DIASTOLIC BLOOD PRESSURE: 71 MMHG | RESPIRATION RATE: 18 BRPM | HEART RATE: 64 BPM | HEIGHT: 62 IN | SYSTOLIC BLOOD PRESSURE: 118 MMHG

## 2019-11-01 DIAGNOSIS — I10 ESSENTIAL HYPERTENSION: ICD-10-CM

## 2019-11-01 DIAGNOSIS — Z01.419 ENCOUNTER FOR GYNECOLOGICAL EXAMINATION WITHOUT ABNORMAL FINDING: Primary | ICD-10-CM

## 2019-11-01 DIAGNOSIS — E66.9 OBESITY WITHOUT SERIOUS COMORBIDITY, UNSPECIFIED CLASSIFICATION, UNSPECIFIED OBESITY TYPE: ICD-10-CM

## 2019-11-01 DIAGNOSIS — K75.81 NASH (NONALCOHOLIC STEATOHEPATITIS): ICD-10-CM

## 2019-11-01 DIAGNOSIS — Z12.11 SPECIAL SCREENING FOR MALIGNANT NEOPLASMS, COLON: ICD-10-CM

## 2019-11-01 DIAGNOSIS — E78.00 HYPERCHOLESTEROLEMIA: ICD-10-CM

## 2019-11-01 NOTE — PROGRESS NOTES
Subjective:  Lis Simms is a 62 y.o. female here for annual physical exam.       Health Habits. 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, patient  Last dental appointment:   Over the summer  Last eye exam:  Over the summer  Uses seatbelts regularly :  yes  Getting regular exercise:  no  Last colonoscopy:   8/9/13  Last mammogram:  5/6/19        Patient Active Problem List   Diagnosis Code    Pulmonary sarcoidosis ([facility] [address] D86.0    Obesity E66.9    Essential hypertension I10    Palpitations R00.2    GERD (gastroesophageal reflux disease) K21.9    Celiac disease K90.0    DEL ANGEL (nonalcoholic steatohepatitis) K75.81    Hypercholesterolemia E78.00     Past Medical History:   Diagnosis Date    Celiac disease 10/29/2015    Contact dermatitis and other eczema, due to unspecified cause     Essential hypertension 2/27/2013    GERD (gastroesophageal reflux disease) 11/3/2014    HTN (hypertension) 2/27/2013    Hypercholesterolemia 10/31/2017    DEL ANGEL (nonalcoholic steatohepatitis) 10/29/2015    Rosacea 8/31/2011    Sarcoidosis 2007    Vitamin D deficiency 8/31/2011     Past Surgical History:   Procedure Laterality Date    HX OTHER SURGICAL      Biospy of lymph nodes chest      Family History   Problem Relation Age of Onset    Cancer Father     Hypertension Father     Breast Cancer Maternal Grandmother     Diabetes Maternal Grandmother     Stroke Maternal Grandmother     Cancer Paternal Grandmother     Cancer Paternal Grandfather     Hypertension Mother      Social History     Tobacco Use    Smoking status: Never Smoker    Smokeless tobacco: Never Used   Substance Use Topics    Alcohol use: No     Alcohol/week: 0.0 standard drinks     Allergies   Allergen Reactions    Clindamycin Rash    Erythromycin Other (comments)     Causes stomach pain    Flagyl [Metronidazole] Nausea Only    Pcn [Penicillins] Rash     Current Outpatient Medications   Medication Sig Dispense Refill    metoprolol succinate (TOPROL-XL) 25 mg XL tablet take 1 tablet by mouth once daily (NEEDS APPOINTMENT) 30 Tab 11    montelukast (SINGULAIR) 10 mg tablet take 1 tablet by mouth once daily 30 Tab 11    SPIRIVA WITH HANDIHALER 18 mcg inhalation capsule inhale contents of 1 capsule by mouth once daily 30 Cap 11    raNITIdine (ZANTAC) 300 mg tab take 1 tablet by mouth once daily 30 Tab 11    ERGOCALCIFEROL, VITAMIN D2, PO Take 1,000 mg by mouth daily.  vitamin E (AQUA GEMS) 400 unit capsule Take 400 Units by mouth two (2) times a day.  calcium-cholecalciferol, d3, (CALCIUM 600 + D) 600-125 mg-unit Tab Take 600 mg by mouth two (2) times a day.          Review of Systems  A comprehensive review of systems was negative except for: Allergic/Immunologic: positive for hay fever    Objective:  Visit Vitals  /71   Pulse 64   Temp 96.4 °F (35.8 °C) (Oral)   Resp 18   Ht 5' 2\" (1.575 m)   Wt 184 lb (83.5 kg)   LMP 01/24/2017 (Exact Date)   BMI 33.65 kg/m²     Physical Examination:   General appearance - alert, well appearing, and in no distress  Mental status - alert, oriented to person, place, and time, normal mood, behavior, speech, dress, motor activity, and thought processes  Eyes - pupils equal and reactive, extraocular eye movements intact, sclera anicteric  Ears - bilateral TM's and external ear canals normal  Nose - normal and patent, no erythema, discharge or polyps  Mouth - mucous membranes moist, pharynx normal without lesions and dental hygiene good  Neck - supple, no significant adenopathy, carotids upstroke normal bilaterally, no bruits, thyroid exam: thyroid is normal in size without nodules or tenderness  Lymphatics - no palpable lymphadenopathy, no hepatosplenomegaly  Chest - clear to auscultation, no wheezes, rales or rhonchi, symmetric air entry  Heart - normal rate, regular rhythm, normal S1, S2, no murmurs, rubs, clicks or gallops  Abdomen - soft, nontender, nondistended, no masses or organomegaly  bowel sounds normal  Breasts - breasts appear normal, no suspicious masses, no skin or nipple changes or axillary nodes  Pelvic - normal external genitalia, vulva, vagina, cervix, uterus and adnexa  Rectal - normal rectal, no masses  Neurological - alert, oriented, normal speech, no focal findings or movement disorder noted  Musculoskeletal - normal gait  Extremities - peripheral pulses normal, no pedal edema, no clubbing or cyanosis  Skin - normal coloration and turgor, no rashes, no suspicious skin lesions noted     Assessment/Plan:    ICD-10-CM ICD-9-CM    1. Encounter for gynecological examination without abnormal finding Z01.419 V72.31 CBC WITH AUTOMATED DIFF      PAP IG, HPV AND RFX HPV 96/31,05([id number])   2. Essential hypertension D26 868.4 METABOLIC PANEL, COMPREHENSIVE   3. Hypercholesterolemia S51.21 082.1 METABOLIC PANEL, COMPREHENSIVE      LIPID PANEL   4. DEL ANGEL (nonalcoholic steatohepatitis) G11.85 855.6 METABOLIC PANEL, COMPREHENSIVE   5. Obesity without serious comorbidity, unspecified classification, unspecified obesity type E66.9 278.00          Breast awareness  Labs per orders. Regular exercise    Follow-up and Dispositions    · Return in about 1 month (around 12/1/2019) for obesity. Reviewed plan of care. Patient has provided input and agrees with goals.

## 2019-11-02 LAB
ALBUMIN SERPL-MCNC: 4.1 G/DL (ref 3.5–5.5)
ALBUMIN/GLOB SERPL: 1.2 {RATIO} (ref 1.2–2.2)
ALP SERPL-CCNC: 131 IU/L (ref 39–117)
ALT SERPL-CCNC: 30 IU/L (ref 0–32)
AST SERPL-CCNC: 22 IU/L (ref 0–40)
BASOPHILS # BLD AUTO: 0.1 X10E3/UL (ref 0–0.2)
BASOPHILS NFR BLD AUTO: 1 %
BILIRUB SERPL-MCNC: 0.3 MG/DL (ref 0–1.2)
BUN SERPL-MCNC: 12 MG/DL (ref 6–24)
BUN/CREAT SERPL: 13 (ref 9–23)
CALCIUM SERPL-MCNC: 9.7 MG/DL (ref 8.7–10.2)
CHLORIDE SERPL-SCNC: 104 MMOL/L (ref 96–106)
CHOLEST SERPL-MCNC: 178 MG/DL (ref 100–199)
CO2 SERPL-SCNC: 24 MMOL/L (ref 20–29)
CREAT SERPL-MCNC: 0.91 MG/DL (ref 0.57–1)
EOSINOPHIL # BLD AUTO: 0.3 X10E3/UL (ref 0–0.4)
EOSINOPHIL NFR BLD AUTO: 4 %
ERYTHROCYTE [DISTWIDTH] IN BLOOD BY AUTOMATED COUNT: 12.6 % (ref 12.3–15.4)
GLOBULIN SER CALC-MCNC: 3.3 G/DL (ref 1.5–4.5)
GLUCOSE SERPL-MCNC: 80 MG/DL (ref 65–99)
HCT VFR BLD AUTO: 43.6 % (ref 34–46.6)
HDLC SERPL-MCNC: 48 MG/DL
HGB BLD-MCNC: 14.5 G/DL (ref 11.1–15.9)
IMM GRANULOCYTES # BLD AUTO: 0 X10E3/UL (ref 0–0.1)
IMM GRANULOCYTES NFR BLD AUTO: 0 %
INTERPRETATION, 910389: NORMAL
LDLC SERPL CALC-MCNC: 109 MG/DL (ref 0–99)
LYMPHOCYTES # BLD AUTO: 1.8 X10E3/UL (ref 0.7–3.1)
LYMPHOCYTES NFR BLD AUTO: 29 %
MCH RBC QN AUTO: 30.6 PG (ref 26.6–33)
MCHC RBC AUTO-ENTMCNC: 33.3 G/DL (ref 31.5–35.7)
MCV RBC AUTO: 92 FL (ref 79–97)
MONOCYTES # BLD AUTO: 0.4 X10E3/UL (ref 0.1–0.9)
MONOCYTES NFR BLD AUTO: 6 %
NEUTROPHILS # BLD AUTO: 3.9 X10E3/UL (ref 1.4–7)
NEUTROPHILS NFR BLD AUTO: 60 %
PLATELET # BLD AUTO: 254 X10E3/UL (ref 150–450)
POTASSIUM SERPL-SCNC: 4.7 MMOL/L (ref 3.5–5.2)
PROT SERPL-MCNC: 7.4 G/DL (ref 6–8.5)
RBC # BLD AUTO: 4.74 X10E6/UL (ref 3.77–5.28)
SODIUM SERPL-SCNC: 143 MMOL/L (ref 134–144)
TRIGL SERPL-MCNC: 103 MG/DL (ref 0–149)
VLDLC SERPL CALC-MCNC: 21 MG/DL (ref 5–40)
WBC # BLD AUTO: 6.4 X10E3/UL (ref 3.4–10.8)

## 2019-11-03 DIAGNOSIS — R74.8 ALKALINE PHOSPHATASE RAISED: Primary | ICD-10-CM

## 2019-11-03 LAB — HEMOCCULT STL QL IA: NEGATIVE

## 2019-11-12 LAB
CYTOLOGIST CVX/VAG CYTO: NORMAL
CYTOLOGY CVX/VAG DOC CYTO: NORMAL
CYTOLOGY CVX/VAG DOC THIN PREP: NORMAL
DX ICD CODE: NORMAL
HPV I/H RISK 1 DNA CVX QL PROBE+SIG AMP: NEGATIVE
Lab: NORMAL
Lab: NORMAL
OTHER STN SPEC: NORMAL
STAT OF ADQ CVX/VAG CYTO-IMP: NORMAL

## 2019-11-13 ENCOUNTER — TELEPHONE (OUTPATIENT)
Dept: FAMILY MEDICINE CLINIC | Age: 54
End: 2019-11-13

## 2019-11-13 NOTE — TELEPHONE ENCOUNTER
As far as I know, the prescription doses of Zantac have not been recalled.   She needs to check with her pharmacist.

## 2019-11-13 NOTE — TELEPHONE ENCOUNTER
Called and spoke with [facility] and she has been advised and states understanding of this. Pt agrees with plan.

## 2019-11-15 ENCOUNTER — TELEPHONE (OUTPATIENT)
Dept: FAMILY MEDICINE CLINIC | Age: 54
End: 2019-11-15

## 2019-11-15 DIAGNOSIS — K21.9 GASTROESOPHAGEAL REFLUX DISEASE, ESOPHAGITIS PRESENCE NOT SPECIFIED: Primary | ICD-10-CM

## 2019-11-19 RX ORDER — OMEPRAZOLE 20 MG/1
20 CAPSULE, DELAYED RELEASE ORAL DAILY
Qty: 30 CAP | Refills: 2 | Status: SHIPPED | OUTPATIENT
Start: 2019-11-19 | End: 2020-02-10

## 2019-12-01 NOTE — PROGRESS NOTES
HISTORY OF PRESENT ILLNESS  Alex Zamora is a 62 y.o. female. Patient presents with:  Hypertension: 1 mo f/u  Abdominal Pain: \"twinges in rt side\" intermittent    Alex Zamora is here for follow up on her obesity. She has lost weight before with Weight [employer], but she went off it. Also, she was exercising 3 days a week. She has a fatty liver and needs to lose weight. Her abdominal symptoms have only been for the past week or so and are getting better. Nothing in particular make them better or worse. No increase in flautus or burping. Passing gas or having a bm does not make it better. This is concerning her because her liver function tests were elevated. Review of Systems   Constitutional: Negative for chills, fever and weight loss. No weight gain   Gastrointestinal: Positive for abdominal pain. Negative for blood in stool, constipation, diarrhea, heartburn, melena, nausea and vomiting. Visit Vitals  /74   Pulse 65   Temp 97.4 °F (36.3 °C) (Oral)   Resp 18   Ht 5' 2\" (1.575 m)   Wt 187 lb (84.8 kg)   LMP 01/24/2017 (Exact Date)   BMI 34.20 kg/m²     BP Readings from Last 3 Encounters:   11/01/19 118/71   05/20/19 129/64   05/16/19 119/73     Physical Exam  Vitals signs and nursing note reviewed. Constitutional:       General: She is not in acute distress. Appearance: Normal appearance. She is well-developed. She is not diaphoretic. Cardiovascular:      Rate and Rhythm: Normal rate and regular rhythm. Heart sounds: Normal heart sounds. No murmur. No friction rub. No gallop. Pulmonary:      Effort: Pulmonary effort is normal. No respiratory distress. Breath sounds: Normal breath sounds. No wheezing or rales. Abdominal:      General: Bowel sounds are normal. There is no distension. Palpations: Abdomen is soft. Tenderness: There is no tenderness. There is no guarding or rebound. Skin:     General: Skin is warm and dry.    Neurological:      Mental Status: She is alert and oriented to person, place, and time. ASSESSMENT and PLAN    ICD-10-CM ICD-9-CM    1. Obesity without serious comorbidity, unspecified classification, unspecified obesity type E66.9 278.00    2. DEL ANGEL (nonalcoholic steatohepatitis) K75.81 571.8    3. Abdominal pain, unspecified abdominal location R10.9 789.00         Obesity making her DEL ANGEL worse  Resolving abdominal pain, likely related to bowel gas  HIIT for exercise  Daily Metamucil  Mindful eating  Reassurance that her abdominal pain is likely benign and not related to her liver      Follow-up and Dispositions    · Return in about 6 weeks (around 1/13/2020) for check weight. Reviewed plan of care. Patient has provided input and agrees with goals.

## 2019-12-02 ENCOUNTER — OFFICE VISIT (OUTPATIENT)
Dept: FAMILY MEDICINE CLINIC | Age: 54
End: 2019-12-02

## 2019-12-02 VITALS
BODY MASS INDEX: 34.41 KG/M2 | HEIGHT: 62 IN | DIASTOLIC BLOOD PRESSURE: 74 MMHG | RESPIRATION RATE: 18 BRPM | TEMPERATURE: 97.4 F | HEART RATE: 65 BPM | WEIGHT: 187 LBS | SYSTOLIC BLOOD PRESSURE: 124 MMHG

## 2019-12-02 DIAGNOSIS — K75.81 NASH (NONALCOHOLIC STEATOHEPATITIS): ICD-10-CM

## 2019-12-02 DIAGNOSIS — E66.9 OBESITY WITHOUT SERIOUS COMORBIDITY, UNSPECIFIED CLASSIFICATION, UNSPECIFIED OBESITY TYPE: Primary | ICD-10-CM

## 2019-12-02 DIAGNOSIS — R10.9 ABDOMINAL PAIN, UNSPECIFIED ABDOMINAL LOCATION: ICD-10-CM

## 2019-12-02 NOTE — PATIENT INSTRUCTIONS
WEIGHT LOSS RECOMMENDATIONS: 
 
 
EXERCISE:  HIIT 
 
20 seconds all out 10 seconds rest 
8-30 second intervals For 4 minutes Metamucil once  daily Decrease portions Divide plate in 1/2, when 1/2 finished, ask yourself it you are still hungry and put the rest away for later if you are not Put fork down after each bite No distractions while eating Weigh daily, write it down, bring to next appointment

## 2019-12-02 NOTE — PROGRESS NOTES
Chief Complaint   Patient presents with    Hypertension     1 mo f/u    Abdominal Pain     \"twinges in rt side\" intermittent     1. Have you been to the ER, urgent care clinic since your last visit? Hospitalized since your last visit? No    2. Have you seen or consulted any other health care providers outside of the [address] [place] [address] since your last visit? Include any pap smears or colon screening.  No

## 2019-12-04 LAB
ALP BONE CFR SERPL: 31 % (ref 14–68)
ALP INTEST CFR SERPL: 5 % (ref 0–18)
ALP LIVER CFR SERPL: 64 % (ref 18–85)
ALP SERPL-CCNC: 112 IU/L (ref 39–117)
GGT SERPL-CCNC: 44 IU/L (ref 0–60)

## 2020-01-27 ENCOUNTER — OFFICE VISIT (OUTPATIENT)
Dept: FAMILY MEDICINE CLINIC | Age: 55
End: 2020-01-27

## 2020-01-27 VITALS
HEART RATE: 57 BPM | DIASTOLIC BLOOD PRESSURE: 69 MMHG | TEMPERATURE: 96.3 F | HEIGHT: 62 IN | RESPIRATION RATE: 18 BRPM | WEIGHT: 183 LBS | SYSTOLIC BLOOD PRESSURE: 127 MMHG | BODY MASS INDEX: 33.68 KG/M2

## 2020-01-27 DIAGNOSIS — E66.9 OBESITY WITHOUT SERIOUS COMORBIDITY, UNSPECIFIED CLASSIFICATION, UNSPECIFIED OBESITY TYPE: Primary | ICD-10-CM

## 2020-01-27 NOTE — PROGRESS NOTES
HISTORY OF PRESENT ILLNESS  Richard Stahl is a 60 y.o. female. Richard Stahl is here for follow up on her obesity. She has lost 4 pounds. Evidently, she has been exercising, taking Metamucil and doing mindful eating, although she feels she could do better with this (she reads while eating). She is drinking more water. Review of Systems   Constitutional: Positive for weight loss. No weight gain       Visit Vitals  /69   Pulse (!) 57   Temp 96.3 °F (35.7 °C) (Oral)   Resp 18   Ht 5' 2\" (1.575 m)   Wt 183 lb (83 kg)   LMP 01/24/2017 (Exact Date)   BMI 33.47 kg/m²     Physical Exam  Constitutional:       General: She is not in acute distress. Appearance: Normal appearance. Neurological:      Mental Status: She is alert and oriented to person, place, and time. ASSESSMENT and PLAN    ICD-10-CM ICD-9-CM    1. Obesity without serious comorbidity, unspecified classification, unspecified obesity type E66.9 278.00         Possible changes discussed - she feels she can increase her exercise and put the book down at meals  Diet discussed in depth  I have reviewed/discussed the above normal BMI with the patient. I have recommended the following interventions: dietary management education, guidance, and counseling, encourage exercise and monitor weight . Unique Mccarthy Over 15 minutes was spent face to face with the patient. Over 1/2 the time was spent counseling her on weight management. Follow-up and Dispositions    · Return in about 2 months (around 3/27/2020) for check weight. Reviewed plan of care. Patient has provided input and agrees with goals.

## 2020-02-10 DIAGNOSIS — K21.9 GASTROESOPHAGEAL REFLUX DISEASE, ESOPHAGITIS PRESENCE NOT SPECIFIED: ICD-10-CM

## 2020-02-10 RX ORDER — OMEPRAZOLE 20 MG/1
CAPSULE, DELAYED RELEASE ORAL
Qty: 90 CAP | Refills: 4 | Status: SHIPPED | OUTPATIENT
Start: 2020-02-10 | End: 2021-08-14

## 2020-03-13 RX ORDER — TIOTROPIUM BROMIDE 18 UG/1
CAPSULE ORAL; RESPIRATORY (INHALATION)
Qty: 30 CAP | Refills: 0 | Status: SHIPPED | OUTPATIENT
Start: 2020-03-13 | End: 2020-04-05

## 2020-03-13 RX ORDER — MONTELUKAST SODIUM 10 MG/1
TABLET ORAL
Qty: 30 TAB | Refills: 0 | Status: SHIPPED | OUTPATIENT
Start: 2020-03-13 | End: 2020-04-05

## 2020-04-05 RX ORDER — MONTELUKAST SODIUM 10 MG/1
TABLET ORAL
Qty: 90 TAB | Refills: 3 | Status: SHIPPED | OUTPATIENT
Start: 2020-04-05 | End: 2021-04-02

## 2020-04-05 RX ORDER — TIOTROPIUM BROMIDE 18 UG/1
CAPSULE ORAL; RESPIRATORY (INHALATION)
Qty: 90 CAP | Refills: 3 | Status: SHIPPED | OUTPATIENT
Start: 2020-04-05 | End: 2021-04-05

## 2020-04-13 RX ORDER — METOPROLOL SUCCINATE 25 MG/1
TABLET, EXTENDED RELEASE ORAL
Qty: 90 TAB | Refills: 3 | Status: SHIPPED | OUTPATIENT
Start: 2020-04-13 | End: 2020-05-06

## 2020-05-06 RX ORDER — METOPROLOL SUCCINATE 25 MG/1
TABLET, EXTENDED RELEASE ORAL
Qty: 90 TAB | Refills: 2 | Status: SHIPPED | OUTPATIENT
Start: 2020-05-06 | End: 2020-11-02

## 2020-11-02 ENCOUNTER — OFFICE VISIT (OUTPATIENT)
Dept: FAMILY MEDICINE CLINIC | Age: 55
End: 2020-11-02
Payer: COMMERCIAL

## 2020-11-02 VITALS
WEIGHT: 190 LBS | RESPIRATION RATE: 18 BRPM | HEART RATE: 80 BPM | HEIGHT: 62 IN | DIASTOLIC BLOOD PRESSURE: 93 MMHG | SYSTOLIC BLOOD PRESSURE: 144 MMHG | BODY MASS INDEX: 34.96 KG/M2 | TEMPERATURE: 97 F

## 2020-11-02 DIAGNOSIS — E78.00 HYPERCHOLESTEROLEMIA: ICD-10-CM

## 2020-11-02 DIAGNOSIS — Z23 ENCOUNTER FOR IMMUNIZATION: ICD-10-CM

## 2020-11-02 DIAGNOSIS — I10 ESSENTIAL HYPERTENSION: Primary | ICD-10-CM

## 2020-11-02 DIAGNOSIS — D86.0 PULMONARY SARCOIDOSIS (HCC): ICD-10-CM

## 2020-11-02 DIAGNOSIS — I10 ESSENTIAL HYPERTENSION: ICD-10-CM

## 2020-11-02 PROCEDURE — 90471 IMMUNIZATION ADMIN: CPT | Performed by: FAMILY MEDICINE

## 2020-11-02 PROCEDURE — 90686 IIV4 VACC NO PRSV 0.5 ML IM: CPT | Performed by: FAMILY MEDICINE

## 2020-11-02 PROCEDURE — 99214 OFFICE O/P EST MOD 30 MIN: CPT | Performed by: FAMILY MEDICINE

## 2020-11-02 RX ORDER — METOPROLOL SUCCINATE 25 MG/1
TABLET, EXTENDED RELEASE ORAL
Qty: 30 TAB | Refills: 1 | Status: SHIPPED | OUTPATIENT
Start: 2020-11-02 | End: 2020-11-05

## 2020-11-02 NOTE — PROGRESS NOTES
HISTORY OF PRESENT ILLNESS  Sven Crowder is a 60 y.o. female. Sven Crowder is here to follow up on their HTN. This is a chronic problem. The problem occurs constantly and is worse. The symptoms are relieved by Toprol XL, which is/are working moderately. She has pulmonary sarcoidosis but does not see Pulmonary. Review of Systems   Constitutional: Negative for weight loss. Weight gain   Eyes: Negative for blurred vision. Respiratory: Negative for shortness of breath. Cardiovascular: Negative for chest pain and leg swelling. Gastrointestinal: Negative for abdominal pain. Neurological: Negative for dizziness, sensory change, speech change, focal weakness and headaches. Visit Vitals  BP (!) 144/93   Pulse 80   Temp 97 °F (36.1 °C) (Temporal)   Resp 18   Ht 5' 2\" (1.575 m)   Wt 190 lb (86.2 kg)   LMP 01/24/2017 (Exact Date)   BMI 34.75 kg/m²         BP Readings from Last 3 Encounters:   01/27/20 127/69   12/02/19 124/74   11/01/19 118/71       Physical Exam  Vitals signs and nursing note reviewed. Constitutional:       General: She is not in acute distress. Appearance: She is well-developed. She is not diaphoretic. Cardiovascular:      Rate and Rhythm: Normal rate and regular rhythm. Heart sounds: Normal heart sounds. No murmur. No friction rub. No gallop. Pulmonary:      Effort: Pulmonary effort is normal. No respiratory distress. Breath sounds: Normal breath sounds. No wheezing or rales. Skin:     General: Skin is warm and dry. Neurological:      Mental Status: She is alert and oriented to person, place, and time. ASSESSMENT and PLAN    ICD-10-CM ICD-9-CM    1. Essential hypertension  R48 945.1 METABOLIC PANEL, BASIC      metoprolol succinate (TOPROL-XL) 25 mg XL tablet   2. Hypercholesterolemia  E78.00 272.0 LIPID PANEL      HEPATIC FUNCTION PANEL   3. Pulmonary sarcoidosis ([facility] [address]  D86.0 135 REFERRAL TO PULMONARY DISEASE     517.8    4.  Encounter for immunization  Z23 V03.89 INFLUENZA VIRUS VAC QUAD,SPLIT,PRESV FREE SYRINGE IM        Blood pressure elevated  Needs labs  Increase Toprol XL  Labs per orders. Pulmonary referral  Flu shot    Follow-up and Dispositions    · Return in about 6 weeks (around 12/14/2020) for blood pressure. Reviewed plan of care. Patient has provided input and agrees with goals.

## 2020-11-05 ENCOUNTER — TELEPHONE (OUTPATIENT)
Dept: FAMILY MEDICINE CLINIC | Age: 55
End: 2020-11-05

## 2020-11-05 DIAGNOSIS — I10 ESSENTIAL HYPERTENSION: ICD-10-CM

## 2020-11-05 RX ORDER — METOPROLOL SUCCINATE 50 MG/1
TABLET, EXTENDED RELEASE ORAL
Qty: 30 TAB | Refills: 1 | Status: SHIPPED | OUTPATIENT
Start: 2020-11-05 | End: 2020-12-30

## 2020-11-05 NOTE — TELEPHONE ENCOUNTER
Pt asking what dose of metropolol she needs to be on. Said Dr Karthikeyan Live was going to increase from 25mg but pharmacy said that was the dose prescribed.    Please call [phone]

## 2020-11-07 LAB
ALBUMIN SERPL-MCNC: 4.2 G/DL (ref 3.8–4.9)
ALP SERPL-CCNC: 138 IU/L (ref 39–117)
ALT SERPL-CCNC: 44 IU/L (ref 0–32)
AST SERPL-CCNC: 25 IU/L (ref 0–40)
BILIRUB DIRECT SERPL-MCNC: 0.12 MG/DL (ref 0–0.4)
BILIRUB SERPL-MCNC: 0.4 MG/DL (ref 0–1.2)
BUN SERPL-MCNC: 16 MG/DL (ref 6–24)
BUN/CREAT SERPL: 18 (ref 9–23)
CALCIUM SERPL-MCNC: 9.6 MG/DL (ref 8.7–10.2)
CHLORIDE SERPL-SCNC: 103 MMOL/L (ref 96–106)
CHOLEST SERPL-MCNC: 174 MG/DL (ref 100–199)
CO2 SERPL-SCNC: 25 MMOL/L (ref 20–29)
CREAT SERPL-MCNC: 0.91 MG/DL (ref 0.57–1)
GLUCOSE SERPL-MCNC: 90 MG/DL (ref 65–99)
HDLC SERPL-MCNC: 46 MG/DL
INTERPRETATION, 910389: NORMAL
LDLC SERPL CALC-MCNC: 107 MG/DL (ref 0–99)
POTASSIUM SERPL-SCNC: 4.3 MMOL/L (ref 3.5–5.2)
PROT SERPL-MCNC: 7.1 G/DL (ref 6–8.5)
SODIUM SERPL-SCNC: 141 MMOL/L (ref 134–144)
TRIGL SERPL-MCNC: 114 MG/DL (ref 0–149)
VLDLC SERPL CALC-MCNC: 21 MG/DL (ref 5–40)

## 2020-11-29 DIAGNOSIS — R79.89 ELEVATED LFTS: Primary | ICD-10-CM

## 2020-11-29 DIAGNOSIS — R79.89 ELEVATED LFTS: ICD-10-CM

## 2020-12-14 ENCOUNTER — OFFICE VISIT (OUTPATIENT)
Dept: FAMILY MEDICINE CLINIC | Age: 55
End: 2020-12-14
Payer: COMMERCIAL

## 2020-12-14 VITALS
OXYGEN SATURATION: 98 % | WEIGHT: 190 LBS | HEART RATE: 75 BPM | HEIGHT: 62 IN | SYSTOLIC BLOOD PRESSURE: 128 MMHG | DIASTOLIC BLOOD PRESSURE: 82 MMHG | TEMPERATURE: 98 F | BODY MASS INDEX: 34.96 KG/M2 | RESPIRATION RATE: 20 BRPM

## 2020-12-14 DIAGNOSIS — I10 BENIGN ESSENTIAL HTN: Primary | ICD-10-CM

## 2020-12-14 PROCEDURE — 99213 OFFICE O/P EST LOW 20 MIN: CPT | Performed by: FAMILY MEDICINE

## 2020-12-14 NOTE — PROGRESS NOTES
HISTORY OF PRESENT ILLNESS  Rayo Coyel is a 60 y.o. female. Rayo Coyle is here to follow up on their HTN. This is a chronic problem. The problem occurs constantly and is better. The symptoms are relieved by Toprol XL, which is/are working well. Review of Systems   Constitutional: Negative for weight loss. No weight gain   Eyes: Negative for blurred vision. Respiratory: Negative for shortness of breath. Cardiovascular: Negative for chest pain and leg swelling. Gastrointestinal: Negative for abdominal pain. Neurological: Negative for dizziness, sensory change, speech change, focal weakness and headaches. Visit Vitals  /82   Pulse 75   Temp 98 °F (36.7 °C) (Temporal)   Resp 20   Ht 5' 2\" (1.575 m)   Wt 190 lb (86.2 kg)   LMP 01/24/2017 (Exact Date)   SpO2 98%   BMI 34.75 kg/m²     BP Readings from Last 3 Encounters:   12/14/20 128/82   11/02/20 (!) 144/93   01/27/20 127/69       Physical Exam  Vitals signs and nursing note reviewed. Constitutional:       General: She is not in acute distress. Appearance: She is well-developed. She is not diaphoretic. Cardiovascular:      Rate and Rhythm: Normal rate and regular rhythm. Heart sounds: Normal heart sounds. No murmur. No friction rub. No gallop. Pulmonary:      Effort: Pulmonary effort is normal. No respiratory distress. Breath sounds: Normal breath sounds. No wheezing or rales. Skin:     General: Skin is warm and dry. Neurological:      Mental Status: She is alert and oriented to person, place, and time. ASSESSMENT and PLAN    ICD-10-CM ICD-9-CM    1. Benign essential HTN  I10 401.1         Blood pressure controlled  Continue current plans. Follow-up and Dispositions    · Return in about 6 months (around 6/14/2021) for blood pressure. Reviewed plan of care. Patient has provided input and agrees with goals.

## 2020-12-30 DIAGNOSIS — I10 ESSENTIAL HYPERTENSION: ICD-10-CM

## 2020-12-30 RX ORDER — METOPROLOL SUCCINATE 50 MG/1
TABLET, EXTENDED RELEASE ORAL
Qty: 30 TAB | Refills: 1 | Status: SHIPPED | OUTPATIENT
Start: 2020-12-30 | End: 2021-03-04

## 2021-03-01 DIAGNOSIS — I10 ESSENTIAL HYPERTENSION: ICD-10-CM

## 2021-03-04 RX ORDER — METOPROLOL SUCCINATE 50 MG/1
TABLET, EXTENDED RELEASE ORAL
Qty: 90 TAB | Refills: 3 | Status: SHIPPED | OUTPATIENT
Start: 2021-03-04 | End: 2022-01-24 | Stop reason: SDUPTHER

## 2021-04-02 RX ORDER — MONTELUKAST SODIUM 10 MG/1
TABLET ORAL
Qty: 90 TAB | Refills: 3 | Status: SHIPPED | OUTPATIENT
Start: 2021-04-02 | End: 2022-04-04

## 2021-04-05 RX ORDER — TIOTROPIUM BROMIDE 18 UG/1
CAPSULE ORAL; RESPIRATORY (INHALATION)
Qty: 90 CAP | Refills: 3 | Status: SHIPPED | OUTPATIENT
Start: 2021-04-05 | End: 2022-04-04

## 2021-07-26 ENCOUNTER — OFFICE VISIT (OUTPATIENT)
Dept: FAMILY MEDICINE CLINIC | Age: 56
End: 2021-07-26
Payer: COMMERCIAL

## 2021-07-26 VITALS
SYSTOLIC BLOOD PRESSURE: 135 MMHG | OXYGEN SATURATION: 99 % | HEART RATE: 56 BPM | TEMPERATURE: 97.8 F | BODY MASS INDEX: 35.15 KG/M2 | WEIGHT: 192.2 LBS | RESPIRATION RATE: 18 BRPM | DIASTOLIC BLOOD PRESSURE: 79 MMHG

## 2021-07-26 DIAGNOSIS — K75.81 NASH (NONALCOHOLIC STEATOHEPATITIS): ICD-10-CM

## 2021-07-26 DIAGNOSIS — N95.0 POSTMENOPAUSAL BLEEDING: ICD-10-CM

## 2021-07-26 DIAGNOSIS — Z23 ENCOUNTER FOR IMMUNIZATION: ICD-10-CM

## 2021-07-26 DIAGNOSIS — Z12.31 ENCOUNTER FOR SCREENING MAMMOGRAM FOR MALIGNANT NEOPLASM OF BREAST: ICD-10-CM

## 2021-07-26 DIAGNOSIS — I10 ESSENTIAL HYPERTENSION: Primary | ICD-10-CM

## 2021-07-26 DIAGNOSIS — D86.0 PULMONARY SARCOIDOSIS (HCC): ICD-10-CM

## 2021-07-26 PROCEDURE — 99214 OFFICE O/P EST MOD 30 MIN: CPT | Performed by: FAMILY MEDICINE

## 2021-07-26 RX ORDER — ZOSTER VACCINE RECOMBINANT, ADJUVANTED 50 MCG/0.5
KIT INTRAMUSCULAR
Qty: 0.5 ML | Refills: 1 | Status: SHIPPED | OUTPATIENT
Start: 2021-07-26 | End: 2022-01-24

## 2021-07-26 RX ORDER — ACYCLOVIR 800 MG/1
TABLET ORAL
COMMUNITY
Start: 2021-04-30 | End: 2021-07-26

## 2021-07-26 NOTE — PROGRESS NOTES
HISTORY OF PRESENT ILLNESS  Maikel Esparza is a 60 y.o. female. Patient presents with:  Hypertension: Follow up  Vaginal Bleeding: Post menopause x 3 years. Vaginal Discharge: Redish tinge color. She has pulmonary sarcoidosis, but does not have a pulmonologist.      Review of Systems   Eyes: Negative for blurred vision. Respiratory: Negative for shortness of breath. Cardiovascular: Negative for chest pain. Neurological: Negative for dizziness, sensory change, speech change, focal weakness and headaches. Visit Vitals  /79 (BP 1 Location: Left arm, BP Patient Position: Sitting, BP Cuff Size: Large adult)   Pulse (!) 56   Temp 97.8 °F (36.6 °C)   Resp 18   Wt 192 lb 3.2 oz (87.2 kg)   LMP 01/24/2017 (Exact Date)   SpO2 99%   BMI 35.15 kg/m²     Physical Exam  Vitals and nursing note reviewed. Constitutional:       General: She is not in acute distress. Appearance: She is well-developed. She is not diaphoretic. Cardiovascular:      Rate and Rhythm: Normal rate and regular rhythm. Heart sounds: Normal heart sounds. No murmur heard. No friction rub. No gallop. Pulmonary:      Effort: Pulmonary effort is normal. No respiratory distress. Breath sounds: Normal breath sounds. No wheezing or rales. Skin:     General: Skin is warm and dry. Neurological:      Mental Status: She is alert and oriented to person, place, and time. ASSESSMENT and PLAN    ICD-10-CM ICD-9-CM    1. Essential hypertension  H56 549.0 METABOLIC PANEL, BASIC      METABOLIC PANEL, BASIC   2. Pulmonary sarcoidosis (HCC)  D86.0 135 REFERRAL TO PULMONARY DISEASE     517.8    3. DEL ANGEL (nonalcoholic steatohepatitis)  K75.81 571.8 HEPATIC FUNCTION PANEL      HEPATIC FUNCTION PANEL   4. Postmenopausal bleeding  N95.0 627.1 REFERRAL TO GYNECOLOGY   5. Encounter for screening mammogram for malignant neoplasm of breast  Z12.31 V76.12 PRISCILLA 3D ALDAIR W MAMMO BI SCREENING INCL CAD   6.  Encounter for immunization  Z23 V03.89 varicella-zoster recombinant, PF, (Shingrix, PF,) 50 mcg/0.5 mL susr injection        Blood pressure controlled  Sarcoidosis has been stable, but she needs a pulmonologist  Labs per orders. Continue current plans. Pulmonary referral  Gynecology referral  Mammogram  Shingrix at pharmacy    Follow-up and Dispositions    · Return in about 6 months (around 1/26/2022) for blood pressure. Reviewed plan of care. Patient has provided input and agrees with goals.

## 2021-07-26 NOTE — PROGRESS NOTES
Chief Complaint   Patient presents with    Hypertension     Follow up    Vaginal Bleeding     Post menopause x 3 years.  Vaginal Discharge     Redish tinge color. 1. Have you been to the ER, urgent care clinic since your last visit? Hospitalized since your last visit? No    2. Have you seen or consulted any other health care providers outside of the [address] [place] [address] since your last visit? Include any pap smears or colon screening.  No

## 2021-08-14 DIAGNOSIS — K21.9 GASTROESOPHAGEAL REFLUX DISEASE: ICD-10-CM

## 2021-08-14 RX ORDER — OMEPRAZOLE 20 MG/1
CAPSULE, DELAYED RELEASE ORAL
Qty: 90 CAPSULE | Refills: 3 | Status: SHIPPED | OUTPATIENT
Start: 2021-08-14 | End: 2022-10-24 | Stop reason: SDUPTHER

## 2021-08-19 ENCOUNTER — HOSPITAL ENCOUNTER (OUTPATIENT)
Dept: MAMMOGRAPHY | Age: 56
Discharge: HOME | End: 2021-08-19
Attending: FAMILY MEDICINE
Payer: COMMERCIAL

## 2021-08-19 DIAGNOSIS — Z12.31 ENCOUNTER FOR SCREENING MAMMOGRAM FOR MALIGNANT NEOPLASM OF BREAST: ICD-10-CM

## 2021-08-19 PROCEDURE — 77067 SCR MAMMO BI INCL CAD: CPT

## 2021-08-26 ENCOUNTER — OFFICE VISIT (OUTPATIENT)
Dept: OBGYN CLINIC | Age: 56
End: 2021-08-26
Payer: COMMERCIAL

## 2021-08-26 VITALS
BODY MASS INDEX: 34.96 KG/M2 | DIASTOLIC BLOOD PRESSURE: 70 MMHG | WEIGHT: 190 LBS | HEIGHT: 62 IN | SYSTOLIC BLOOD PRESSURE: 122 MMHG

## 2021-08-26 DIAGNOSIS — N95.0 PMB (POSTMENOPAUSAL BLEEDING): Primary | ICD-10-CM

## 2021-08-26 PROCEDURE — 58100 BIOPSY OF UTERUS LINING: CPT | Performed by: OBSTETRICS & GYNECOLOGY

## 2021-08-26 PROCEDURE — 99203 OFFICE O/P NEW LOW 30 MIN: CPT | Performed by: OBSTETRICS & GYNECOLOGY

## 2021-08-26 NOTE — PROGRESS NOTES
Postmenopausal bleeding note      Doc Hooker is a 60 y.o. female who complains of vaginal bleeding after menopause. Referred by PCP. She became menopausal approximately 3 years ago. She noticed vaginal bleeding about 2 months ago which she describes as intermittent spotting lasting up to a few weeks. No personal or family history of bleeding disorders. No new medications. Pad or tampon count: changes every couple hours. Associated symptoms include none. Alleviating factors: none    Aggravating factors: none      The patient is not currently sexually active.     Last Pap smear:was normal/-HPV obtained 11/1/18    Her relevant past medical history:   Past Medical History:   Diagnosis Date    Celiac disease 10/29/2015    Contact dermatitis and other eczema, due to unspecified cause     Essential hypertension 2/27/2013    GERD (gastroesophageal reflux disease) 11/3/2014    HTN (hypertension) 2/27/2013    Hypercholesterolemia 10/31/2017    DEL ANGEL (nonalcoholic steatohepatitis) 10/29/2015    Rosacea 8/31/2011    Sarcoidosis 2007    Vitamin D deficiency 8/31/2011        Past Surgical History:   Procedure Laterality Date    HX OTHER SURGICAL      Biospy of lymph nodes chest     Social History     Occupational History    Not on file   Tobacco Use    Smoking status: Never Smoker    Smokeless tobacco: Never Used   Vaping Use    Vaping Use: Never used   Substance and Sexual Activity    Alcohol use: No     Alcohol/week: 0.0 standard drinks    Drug use: No    Sexual activity: Never     Family History   Problem Relation Age of Onset    Cancer Father     Hypertension Father     Breast Cancer Maternal Grandmother     Diabetes Maternal Grandmother     Stroke Maternal Grandmother     Cancer Paternal Grandmother     Cancer Paternal Grandfather     Hypertension Mother        Allergies   Allergen Reactions    Clindamycin Rash    Erythromycin Other (comments)     Causes stomach pain    Flagyl [Metronidazole] Nausea Only    Pcn [Penicillins] Rash     Prior to Admission medications    Medication Sig Start Date End Date Taking? Authorizing Provider   omeprazole (PRILOSEC) 20 mg capsule take 1 capsule by mouth once daily (TO REPLACE RANITIDINE) 8/14/21  Yes Josph Burkitt, MD   Spiriva with HandiHaler 18 mcg inhalation capsule inhale the contents of one capsule in the handihaler once daily 4/5/21  Yes Josph Burkitt, MD   montelukast (SINGULAIR) 10 mg tablet take 1 tablet by mouth once daily 4/2/21  Yes Josph Burkitt, MD   metoprolol succinate (TOPROL-XL) 50 mg XL tablet take 1 tablet by mouth once daily 3/4/21  Yes Josph Burkitt, MD   ERGOCALCIFEROL, VITAMIN D2, PO Take 1,000 mg by mouth daily. Yes Provider, Historical   vitamin E (AQUA GEMS) 400 unit capsule Take 400 Units by mouth two (2) times a day. Yes Provider, Historical   calcium-cholecalciferol, d3, (CALCIUM 600 + D) 600-125 mg-unit Tab Take 600 mg by mouth two (2) times a day.    Yes Provider, Historical   varicella-zoster recombinant, PF, (Shingrix, PF,) 50 mcg/0.5 mL susr injection 0.5 ml IM once; repeat in one to six months  Patient not taking: Reported on 8/26/2021 7/26/21   Josph Burkitt, MD        Review of Systems - History obtained from the patient  Constitutional: negative for weight loss, fever, night sweats  HEENT: negative for hearing loss, earache, congestion, snoring, sorethroat  CV: negative for chest pain, palpitations, edema  Resp: negative for cough, shortness of breath, wheezing  Breast: negative for breast lumps, nipple discharge, galactorrhea  GI: negative for change in bowel habits, abdominal pain, black or bloody stools  : negative for frequency, dysuria, hematuria  MSK: negative for back pain, joint pain, muscle pain  Skin: negative for itching, rash, hives  Neuro: negative for dizziness, headache, confusion, weakness  Psych: negative for anxiety, depression, change in mood  Heme/lymph: negative for bleeding, bruising, pallor      Objective:  Visit Vitals  /70   Ht 5' 2\" (1.575 m)   Wt 190 lb (86.2 kg)   LMP 01/24/2017 (Exact Date)   BMI 34.75 kg/m²       Physical Exam:   PHYSICAL EXAMINATION    Constitutional  · Appearance: well-nourished, well developed, alert, in no acute distress    Gastrointestinal  · Abdominal Examination: abdomen non-tender to palpation, normal bowel sounds, no masses present  · Liver and spleen: no hepatomegaly present, spleen not palpable  · Hernias: no hernias identified    Genitourinary  · External Genitalia: normal appearance for age, no discharge present, no tenderness present, no inflammatory lesions present, no masses present, no atrophy present  · Vagina: normal vaginal vault without central or paravaginal defects, no discharge present, no inflammatory lesions present, no masses present  · Bladder: non-tender to palpation  · Urethra: appears normal  · Cervix: normal   · Uterus: normal size, shape and consistency  · Adnexa: no adnexal tenderness present, no adnexal masses present  · Perineum: perineum within normal limits, no evidence of trauma, no rashes or skin lesions present  · Anus: anus within normal limits, no hemorrhoids present  · Inguinal Lymph Nodes: no lymphadenopathy present    Skin  · General Inspection: no rash, no lesions identified    Neurologic/Psychiatric  · Mental Status:  · Orientation: grossly oriented to person, place and time  · Mood and Affect: mood normal, affect appropriate    Assessment:   Postmenopausal bleeding    Plan:     Endometrial bx  Needs SIS        Instructions given to pt. Handouts given to pt.       [facility]  OFFICE PROCEDURE PROGRESS NOTE        Chart reviewed for the following:   IHal LPN, have reviewed the History, Physical and updated the Allergic reactions for [address] [place] [address] performed immediately prior to start of procedure:   Danae Price LPN, have performed the following reviews on [address] [facility] [address] prior to the start of the procedure:            * Patient was identified by name and date of birth   * Agreement on procedure being performed was verified  * Risks and Benefits explained to the patient  * Consent was signed and verified     Time: 10:44 AM      Date of procedure: 2021    Procedure performed by:  Rui Childs MD    How tolerated by patient: tolerated the procedure well with no complications    Post Procedural Pain Scale: 2 - Hurts Little Bit    Comments: none      Procedure note: Endometrial biopsy    [address] [facility] [address] is a ,  60 y.o. female Galindo Lewis Patient's last menstrual period was 2017 (exact date). The patient has a history of There were no encounter diagnoses. and presents for an endometrial biopsy. Indications:   After the indications, risks, benefits, and alternatives to performing an endometrial biopsy were explained to the patient, her questions were answered and informed consent was obtained. Procedure: The patient was placed on the table in the dorsal lithotomy position. A bimanual exam showed the uterus to be anterior. The uterus was not enlarged. A speculum was placed in the vagina. The cervix was visualized and prepped with zephrin. A tenaculum was not placed on the anterior lip of the cervix for traction. It was not necessary to dilate the cervix. A pipelle was passed through the endocervical canal without difficulty. The uterus was sounded to 8 cm's. A small amount of tissue was returned. This tissue was placed in formalin and sent to pathology. It was felt that an adequate sample was obtained. The patient tolerated the procedure well and she reported mild cramping. The tenaculum and speculum were removed. Post Procedural Status: The patient was observed for 10 minutes after the procedure. She had mild cramping at the time of discharge. There were no complications. The patient was discharged in stable condition.

## 2021-08-31 LAB
CPT CODES, 490044: NORMAL
CPT DISCLAIMER: NORMAL
CYTOLOGY SPEC DOC CYTO: NORMAL
DIAGNOSIS SYNOPSIS:: NORMAL
DX ICD CODE: NORMAL
PATH REPORT.GROSS SPEC: NORMAL
PATHOLOGIST NAME: NORMAL
SPECIMEN SOURCE: NORMAL

## 2021-09-08 NOTE — PROGRESS NOTES
[facility]  OFFICE PROCEDURE PROGRESS NOTE        Chart reviewed for the following:   I, Matt Sibley, have reviewed the History, Physical and updated the Allergic reactions for [address] performed immediately prior to start of procedure:   Mckenzie Rachel, have performed the following reviews on Darwin Fernandez prior to the start of the procedure:            * Patient was identified by name and date of birth   * Agreement on procedure being performed was verified  * Risks and Benefits explained to the patient  * Procedure site verified and marked as necessary  * Patient was positioned for comfort  * Consent was signed and verified     Time: 3:56 PM        Date of procedure: 2021    Procedure performed by:  Marianne Fung MD    How tolerated by patient: tolerated the procedure well with no complications    Post Procedural Pain Scale: 2 - Hurts Little Bit    Comments: none    SONOHYSTEROGRAPHY    Darwin Fernandez is a ,  60 y.o. female [address] [facility] whose Patient's last menstrual period was 2017 (exact date). was on . , presents for a sonohysterography. The indications for this procedure were reviewed with the patient. The procedure was explained in detail and all questions were answered. Procedure: The patient was placed in the lithotomy position. A graves speculum was introduced into the vagina and the cervix was visualized. The cervix was prepped with iodine solution. A Cook's Hysterography catheter was then introduced into the uterine cavity and the speculum was removed. Sterile sonohysterography with 3D Reconstruction was performed. The endometrial cavity was distended with sterile saline. The findings are as follows: abnormal cavity with polyp lesions. The patient tolerated the procedure well without complication, and was discharged to home. A:  bleeding due to polyp  P: hyst polyp removal with Myosure  Pt counseled concerning risks of surgery. Risks disc include bleeding, transfusion, infection, readmission, abscess drainage, injury to abdominal organs including bowel, bladder, ureters, need for additional surgery, injury may not be recognized at time of surgery, and risk of death.     Will give miso preop

## 2021-09-09 ENCOUNTER — OFFICE VISIT (OUTPATIENT)
Dept: OBGYN CLINIC | Age: 56
End: 2021-09-09

## 2021-09-09 DIAGNOSIS — N95.0 POSTMENOPAUSAL BLEEDING: Primary | ICD-10-CM

## 2021-09-09 RX ORDER — MISOPROSTOL 200 UG/1
TABLET ORAL
Qty: 4 TABLET | Refills: 0 | Status: SHIPPED | OUTPATIENT
Start: 2021-09-09 | End: 2022-01-24

## 2021-09-21 ENCOUNTER — HOSPITAL ENCOUNTER (OUTPATIENT)
Dept: PREADMISSION TESTING | Age: 56
Discharge: HOME | End: 2021-09-21
Payer: COMMERCIAL

## 2021-09-21 VITALS
HEART RATE: 62 BPM | TEMPERATURE: 97.5 F | BODY MASS INDEX: 35.9 KG/M2 | WEIGHT: 195.11 LBS | OXYGEN SATURATION: 97 % | SYSTOLIC BLOOD PRESSURE: 137 MMHG | DIASTOLIC BLOOD PRESSURE: 63 MMHG | HEIGHT: 62 IN | RESPIRATION RATE: 16 BRPM

## 2021-09-21 DIAGNOSIS — N95.0 POSTMENOPAUSAL BLEEDING: ICD-10-CM

## 2021-09-21 LAB
ANION GAP SERPL CALC-SCNC: 5 MMOL/L (ref 5–15)
ATRIAL RATE: 66 BPM
BUN SERPL-MCNC: 12 MG/DL (ref 6–20)
BUN/CREAT SERPL: 15 (ref 12–20)
CALCIUM SERPL-MCNC: 9.2 MG/DL (ref 8.5–10.1)
CALCULATED P AXIS, ECG09: 42 DEGREES
CALCULATED R AXIS, ECG10: 11 DEGREES
CALCULATED T AXIS, ECG11: 4 DEGREES
CHLORIDE SERPL-SCNC: 108 MMOL/L (ref 97–108)
CO2 SERPL-SCNC: 28 MMOL/L (ref 21–32)
CREAT SERPL-MCNC: 0.79 MG/DL (ref 0.55–1.02)
DIAGNOSIS, 93000: NORMAL
ERYTHROCYTE [DISTWIDTH] IN BLOOD BY AUTOMATED COUNT: 13.2 % (ref 11.5–14.5)
GLUCOSE SERPL-MCNC: 92 MG/DL (ref 65–100)
HCG SERPL QL: POSITIVE
HCT VFR BLD AUTO: 44.1 % (ref 35–47)
HGB BLD-MCNC: 14.3 G/DL (ref 11.5–16)
MCH RBC QN AUTO: 30.4 PG (ref 26–34)
MCHC RBC AUTO-ENTMCNC: 32.4 G/DL (ref 30–36.5)
MCV RBC AUTO: 93.6 FL (ref 80–99)
NRBC # BLD: 0 K/UL (ref 0–0.01)
NRBC BLD-RTO: 0 PER 100 WBC
P-R INTERVAL, ECG05: 126 MS
PLATELET # BLD AUTO: 227 K/UL (ref 150–400)
PMV BLD AUTO: 11.6 FL (ref 8.9–12.9)
POTASSIUM SERPL-SCNC: 4.3 MMOL/L (ref 3.5–5.1)
Q-T INTERVAL, ECG07: 388 MS
QRS DURATION, ECG06: 74 MS
QTC CALCULATION (BEZET), ECG08: 406 MS
RBC # BLD AUTO: 4.71 M/UL (ref 3.8–5.2)
SODIUM SERPL-SCNC: 141 MMOL/L (ref 136–145)
VENTRICULAR RATE, ECG03: 66 BPM
WBC # BLD AUTO: 6.5 K/UL (ref 3.6–11)

## 2021-09-21 PROCEDURE — 36415 COLL VENOUS BLD VENIPUNCTURE: CPT

## 2021-09-21 PROCEDURE — 85027 COMPLETE CBC AUTOMATED: CPT

## 2021-09-21 PROCEDURE — 80048 BASIC METABOLIC PNL TOTAL CA: CPT

## 2021-09-21 PROCEDURE — 93005 ELECTROCARDIOGRAM TRACING: CPT

## 2021-09-21 PROCEDURE — 84703 CHORIONIC GONADOTROPIN ASSAY: CPT

## 2021-09-21 RX ORDER — MELATONIN
1000 DAILY
COMMUNITY

## 2021-09-21 NOTE — PERIOP NOTES
[facility], [address] [facility]   MAIN OR                                  [phone]   MAIN PRE OP                          [phone]                                                                                AMBULATORY PRE OP          [phone]  PRE-ADMISSION TESTING    [phone]   Surgery Date: Wednesday 10/6/21       Is surgery arrival time given by surgeon? YES  NO  If NO, [facility] staff will call you between 3 and 7pm the day before your surgery with your arrival time. (If your surgery is on a Monday, we will call you the Friday before.)    Call [phone] after 7pm Monday-Friday if you did not receive this call. INSTRUCTIONS BEFORE YOUR SURGERY   When You  Arrive Arrive at the 2nd [address] [facility] on the day of your surgery  Have your insurance card, photo ID, and any copayment (if needed)   Food   and   Drink NO food or drink after midnight the night before surgery    This means NO water, gum, mints, coffee, juice, etc.  No alcohol (beer, wine, liquor) 24 hours before and after surgery   Medications to   TAKE   Morning of Surgery MEDICATIONS TO TAKE THE MORNING OF SURGERY WITH A SIP OF WATER:    Metoprolol   omeprazole   Medications  To  STOP      7 days before surgery  Non-Steroidal anti-inflammatory Drugs (NSAID's): for example, Ibuprofen (Advil, Motrin), Naproxen (Aleve)   Aspirin, if taking for pain    Herbal supplements, vitamins, and fish oil   Other:  (Pain medications not listed above, including Tylenol may be taken)   Blood  Thinners  If you take  Aspirin, Plavix, Coumadin, or any blood-thinning or anti-blood clot medicine, talk to the doctor who prescribed the medications for pre-operative instructions.    Bathing Clothing  Jewelry  Valuables      If you shower the morning of surgery, please do not apply anything to your skin (lotions, powders, deodorant, or makeup, especially kevin)   Follow Chlorhex[employer] body wash instructions provided to you during PAT appointment. Begin 3 days prior to surgery.  Do not shave or trim anywhere 24 hours before surgery   Wear your hair loose or down; no pony-tails, buns, or metal hair clips   Wear loose, comfortable, clean clothes   Wear glasses instead of contacts  [employer] money, valuables, and jewelry, including body piercings, at home   If you were given an [employer], bring it on day of surgery. Going Home - or Spending the Night  SAME-DAY SURGERY: You must have a responsible adult drive you home and stay with you 24 hours after surgery   ADMITS: If your doctor is keeping you in the hospital after surgery, leave personal belongings/luggage in your car until you have a hospital room number. Hospital discharge time is 12 noon  Drivers must be here before 12 noon unless you are told differently   Special Instructions It is now mandated that all surgical patients be tested for COVID-19 prior to surgery. Testing has to be exactly 4 days prior to surgery. Your COVID test date is 9/30/21 between 8:00 am and 11:00 am.       COVID testing will be performed curbside at the [facility] [address]. There will be signs leading you to the testing site. You will need to bring a photo ID with you to be swabbed. Patients are advised to self-quarantine at home after testing and prior to your surgery date. You will be notified if your results are positive.     What to watch for:   Coronavirus (COVID-19) affects different people in different ways   It also appears with a wide range of symptoms from mild to severe   Signs usually appear 2-14 days after exposure     If you develop any of the following, notify your doctor immediately:  o Fever  o Chills, with or without a shiver  o Muscle pain  o Headache  o Sore throat  o Dry cough  o New loss of taste or smell  o Tiredness      If you develop any of the following, call [phone]:  o Shortness of breath  o Difficulty breathing  o Chest pain  o New confusion  o Blueness of fingers and/or lips       Follow all instructions so your surgery wont be cancelled. Please, be on time. If a situation occurs and you are delayed the day of surgery, call [phone]     If your physical condition changes (like a fever, cold, flu, etc.) call your surgeon. Home medication(s) reviewed and verified via      LIST   VERBAL   during PAT appointment. The patient was contacted by      IN-PERSON  The patient verbalizes understanding of all instructions and      DOES NOT   need reinforcement.

## 2021-09-22 NOTE — PROGRESS NOTES
Have patient come to office for HCG quant and URINE pregnancy test- a false pos is sometimes seen in postmenopausal patient. FYI this was a preop evaluation.  Usually the urine will be negative

## 2021-09-23 ENCOUNTER — LAB ONLY (OUTPATIENT)
Dept: OBGYN CLINIC | Age: 56
End: 2021-09-23
Payer: COMMERCIAL

## 2021-09-23 DIAGNOSIS — N95.0 POSTMENOPAUSAL BLEEDING: Primary | ICD-10-CM

## 2021-09-23 LAB
HCG URINE, QL. (POC): NEGATIVE
VALID INTERNAL CONTROL?: YES

## 2021-09-23 PROCEDURE — 81025 URINE PREGNANCY TEST: CPT | Performed by: OBSTETRICS & GYNECOLOGY

## 2021-09-24 LAB — HCG INTACT+B SERPL-ACNC: 5 MIU/ML

## 2021-09-30 ENCOUNTER — HOSPITAL ENCOUNTER (OUTPATIENT)
Dept: PREADMISSION TESTING | Age: 56
Discharge: HOME | End: 2021-09-30
Payer: COMMERCIAL

## 2021-09-30 LAB
SARS-COV-2, XPLCVT: NOT DETECTED
SOURCE, COVRS: NORMAL

## 2021-09-30 PROCEDURE — U0005 INFEC AGEN DETEC AMPLI PROBE: HCPCS

## 2021-10-05 ENCOUNTER — ANESTHESIA EVENT (OUTPATIENT)
Dept: SURGERY | Age: 56
End: 2021-10-05
Payer: COMMERCIAL

## 2021-10-05 NOTE — H&P
Gynecology History and Physical    Name: Pj Nguyen MRN: [id number] SSN: [id number]    YOB: 1965  Age: 60 y.o. Sex: female       Subjective:      Chief complaint:  Postmenopausal bleeding    Audley Hamman is a 60 y.o.  female with a history of postmenopausal bleeding. Previous workup included an endometrial biopsy which revealed insufficient for diagnosis and a sonohysterogram which revealed polyp(s). Previous treatment measures included observation. She is admitted for Procedure(s) (LRB):  HYSTEROSCOPY, DILATION AND CURETTAGE POLYPECTOMY WITH MYOSURE (N/A). The current method of family planning is post menopausal status.     OB History        1    Para   1    Term   1            AB        Living           SAB        TAB        Ectopic        Molar        Multiple        Live Births   1              Past Medical History:   Diagnosis Date    Celiac disease 10/29/2015    Contact dermatitis and other eczema, due to unspecified cause     GERD (gastroesophageal reflux disease) 11/3/2014    HTN (hypertension) 2013    Hypercholesterolemia 10/31/2017    DEL ANGEL (nonalcoholic steatohepatitis) 10/29/2015    Rosacea 2011    Sarcoidosis 2007    Vitamin D deficiency 2011     Past Surgical History:   Procedure Laterality Date    HX OTHER SURGICAL      Biospy of lymph nodes chest    HX WISDOM TEETH EXTRACTION       Social History     Occupational History    Not on file   Tobacco Use    Smoking status: Never Smoker    Smokeless tobacco: Never Used   Vaping Use    Vaping Use: Never used   Substance and Sexual Activity    Alcohol use: No     Alcohol/week: 0.0 standard drinks    Drug use: No    Sexual activity: Never     Family History   Problem Relation Age of Onset    Cancer Father     Hypertension Father     Breast Cancer Maternal Grandmother     Diabetes Maternal Grandmother     Stroke Maternal Grandmother     Cancer Paternal Grandmother     Cancer Paternal Grandfather  Hypertension Mother         Allergies   Allergen Reactions    Clindamycin Rash    Erythromycin Other (comments)     Causes stomach pain    Flagyl [Metronidazole] Nausea Only    Pcn [Penicillins] Rash     Prior to Admission medications    Medication Sig Start Date End Date Taking? Authorizing Provider   miSOPROStoL (CYTOTEC) 200 mcg tablet Take 2 by mouth night prior to procedure and 2 by mouth upon waking morning of surgery with a sip only 9/9/21  Yes Lauren Tidwell MD   omeprazole (PRILOSEC) 20 mg capsule take 1 capsule by mouth once daily (TO REPLACE RANITIDINE) 8/14/21  Yes Radha Lucia MD   Spiriva with HandiHaler 18 mcg inhalation capsule inhale the contents of one capsule in the handihaler once daily 4/5/21  Yes Radha Lucia MD   montelukast (SINGULAIR) 10 mg tablet take 1 tablet by mouth once daily 4/2/21  Yes Radha Lucia MD   metoprolol succinate (TOPROL-XL) 50 mg XL tablet take 1 tablet by mouth once daily 3/4/21  Yes Radha Lucia MD   cholecalciferol (Vitamin D3) (1000 Units /25 mcg) tablet Take 1,000 Units by mouth daily. Provider, Historical   varicella-zoster recombinant, PF, (Shingrix, PF,) 50 mcg/0.5 mL susr injection 0.5 ml IM once; repeat in one to six months  Patient not taking: Reported on 10/6/2021 7/26/21   Rdaha Lucia MD   vitamin E (AQUA GEMS) 400 unit capsule Take 400 Units by mouth two (2) times a day. Provider, Historical   calcium-cholecalciferol, d3, (CALCIUM 600 + D) 600-125 mg-unit Tab Take 600 mg by mouth two (2) times a day. Provider, Historical        Review of Systems:  A comprehensive review of systems was negative except for that written in the History of Present Illness. Objective:     Vitals:    10/06/21 0726   BP: 130/60   Pulse: 70   Resp: 14   Temp: 98.4 °F (36.9 °C)   SpO2: 96%   Weight: 195 lb 1.7 oz (88.5 kg)   Height: 5' 2\" (1.575 m)       Physical Exam:  Patient without distress.   Heart: Regular rate and rhythm  Lung: clear to auscultation throughout lung fields, no wheezes, no rales, no rhonchi and normal respiratory effort  Abdomen: soft, nontender  External Genitalia: normal general appearance  Urinary system: urethral meatus normal  Vagina: normal mucosa without prolapse or lesions  Cervix: normal appearance  Adnexa: normal bimanual exam  Uterus: normal single, nontender    Assessment:     Postmenopausal bleeding with suspected endometrial polyp    Plan:     Procedure(s) (LRB):  HYSTEROSCOPY, DILATION AND CURETTAGE POLYPECTOMY WITH MYOSURE (N/A)  Discussed the risks of surgery including the risks of bleeding, infection, deep vein thrombosis, and surgical injuries to internal organs including but not limited to the bowels, bladder, rectum, and female reproductive organs. The patient understands the risks; any and all questions were answered to the patient's satisfaction.     Signed By:  Demetrius Chicas MD     October 6, 2021

## 2021-10-06 ENCOUNTER — ANESTHESIA (OUTPATIENT)
Dept: SURGERY | Age: 56
End: 2021-10-06
Payer: COMMERCIAL

## 2021-10-06 ENCOUNTER — HOSPITAL ENCOUNTER (OUTPATIENT)
Age: 56
Setting detail: OUTPATIENT SURGERY
Discharge: HOME | End: 2021-10-06
Attending: OBSTETRICS & GYNECOLOGY | Admitting: OBSTETRICS & GYNECOLOGY
Payer: COMMERCIAL

## 2021-10-06 VITALS
WEIGHT: 195.11 LBS | OXYGEN SATURATION: 97 % | SYSTOLIC BLOOD PRESSURE: 115 MMHG | RESPIRATION RATE: 11 BRPM | HEIGHT: 62 IN | TEMPERATURE: 97.6 F | HEART RATE: 51 BPM | DIASTOLIC BLOOD PRESSURE: 52 MMHG | BODY MASS INDEX: 35.9 KG/M2

## 2021-10-06 PROCEDURE — 77030041423 HC SYST FLUID MNGMT FLUENT HOLO -D: Performed by: OBSTETRICS & GYNECOLOGY

## 2021-10-06 PROCEDURE — 2709999900 HC NON-CHARGEABLE SUPPLY: Performed by: OBSTETRICS & GYNECOLOGY

## 2021-10-06 PROCEDURE — 77030037417 HC DEV TISS RMVL HOLO -H: Performed by: OBSTETRICS & GYNECOLOGY

## 2021-10-06 PROCEDURE — 74011000250 HC RX REV CODE- 250: Performed by: ANESTHESIOLOGY

## 2021-10-06 PROCEDURE — 76010000138 HC OR TIME 0.5 TO 1 HR: Performed by: OBSTETRICS & GYNECOLOGY

## 2021-10-06 PROCEDURE — 74011250636 HC RX REV CODE- 250/636: Performed by: ANESTHESIOLOGY

## 2021-10-06 PROCEDURE — 76210000006 HC OR PH I REC 0.5 TO 1 HR: Performed by: OBSTETRICS & GYNECOLOGY

## 2021-10-06 PROCEDURE — 77030020143 HC AIRWY LARYN INTUB CGAS -A: Performed by: ANESTHESIOLOGY

## 2021-10-06 PROCEDURE — 58558 HYSTEROSCOPY BIOPSY: CPT | Performed by: OBSTETRICS & GYNECOLOGY

## 2021-10-06 PROCEDURE — 76060000032 HC ANESTHESIA 0.5 TO 1 HR: Performed by: OBSTETRICS & GYNECOLOGY

## 2021-10-06 PROCEDURE — 88305 TISSUE EXAM BY PATHOLOGIST: CPT

## 2021-10-06 PROCEDURE — 76210000020 HC REC RM PH II FIRST 0.5 HR: Performed by: OBSTETRICS & GYNECOLOGY

## 2021-10-06 RX ORDER — PROPOFOL 10 MG/ML
INJECTION, EMULSION INTRAVENOUS AS NEEDED
Status: DISCONTINUED | OUTPATIENT
Start: 2021-10-06 | End: 2021-10-06 | Stop reason: HOSPADM

## 2021-10-06 RX ORDER — DEXAMETHASONE SODIUM PHOSPHATE 4 MG/ML
INJECTION, SOLUTION INTRA-ARTICULAR; INTRALESIONAL; INTRAMUSCULAR; INTRAVENOUS; SOFT TISSUE AS NEEDED
Status: DISCONTINUED | OUTPATIENT
Start: 2021-10-06 | End: 2021-10-06 | Stop reason: HOSPADM

## 2021-10-06 RX ORDER — MIDAZOLAM HYDROCHLORIDE 1 MG/ML
INJECTION, SOLUTION INTRAMUSCULAR; INTRAVENOUS AS NEEDED
Status: DISCONTINUED | OUTPATIENT
Start: 2021-10-06 | End: 2021-10-06 | Stop reason: HOSPADM

## 2021-10-06 RX ORDER — SODIUM CHLORIDE 0.9 % (FLUSH) 0.9 %
5-40 SYRINGE (ML) INJECTION AS NEEDED
Status: DISCONTINUED | OUTPATIENT
Start: 2021-10-06 | End: 2021-10-06 | Stop reason: HOSPADM

## 2021-10-06 RX ORDER — LIDOCAINE HYDROCHLORIDE 10 MG/ML
0.1 INJECTION, SOLUTION EPIDURAL; INFILTRATION; INTRACAUDAL; PERINEURAL AS NEEDED
Status: DISCONTINUED | OUTPATIENT
Start: 2021-10-06 | End: 2021-10-06 | Stop reason: HOSPADM

## 2021-10-06 RX ORDER — SODIUM CHLORIDE 0.9 % (FLUSH) 0.9 %
5-40 SYRINGE (ML) INJECTION EVERY 8 HOURS
Status: DISCONTINUED | OUTPATIENT
Start: 2021-10-06 | End: 2021-10-06 | Stop reason: HOSPADM

## 2021-10-06 RX ORDER — FLUMAZENIL 0.1 MG/ML
0.2 INJECTION INTRAVENOUS
Status: DISCONTINUED | OUTPATIENT
Start: 2021-10-06 | End: 2021-10-06 | Stop reason: HOSPADM

## 2021-10-06 RX ORDER — SODIUM CHLORIDE, SODIUM LACTATE, POTASSIUM CHLORIDE, CALCIUM CHLORIDE 600; 310; 30; 20 MG/100ML; MG/100ML; MG/100ML; MG/100ML
125 INJECTION, SOLUTION INTRAVENOUS CONTINUOUS
Status: DISCONTINUED | OUTPATIENT
Start: 2021-10-06 | End: 2021-10-06 | Stop reason: HOSPADM

## 2021-10-06 RX ORDER — DIPHENHYDRAMINE HYDROCHLORIDE 50 MG/ML
12.5 INJECTION, SOLUTION INTRAMUSCULAR; INTRAVENOUS AS NEEDED
Status: DISCONTINUED | OUTPATIENT
Start: 2021-10-06 | End: 2021-10-06 | Stop reason: HOSPADM

## 2021-10-06 RX ORDER — KETOROLAC TROMETHAMINE 30 MG/ML
INJECTION, SOLUTION INTRAMUSCULAR; INTRAVENOUS AS NEEDED
Status: DISCONTINUED | OUTPATIENT
Start: 2021-10-06 | End: 2021-10-06 | Stop reason: HOSPADM

## 2021-10-06 RX ORDER — ALBUTEROL SULFATE 0.83 MG/ML
2.5 SOLUTION RESPIRATORY (INHALATION) AS NEEDED
Status: DISCONTINUED | OUTPATIENT
Start: 2021-10-06 | End: 2021-10-06 | Stop reason: HOSPADM

## 2021-10-06 RX ORDER — ONDANSETRON 2 MG/ML
INJECTION INTRAMUSCULAR; INTRAVENOUS AS NEEDED
Status: DISCONTINUED | OUTPATIENT
Start: 2021-10-06 | End: 2021-10-06 | Stop reason: HOSPADM

## 2021-10-06 RX ORDER — ONDANSETRON 2 MG/ML
4 INJECTION INTRAMUSCULAR; INTRAVENOUS AS NEEDED
Status: DISCONTINUED | OUTPATIENT
Start: 2021-10-06 | End: 2021-10-06 | Stop reason: HOSPADM

## 2021-10-06 RX ORDER — LIDOCAINE HYDROCHLORIDE 20 MG/ML
INJECTION, SOLUTION EPIDURAL; INFILTRATION; INTRACAUDAL; PERINEURAL AS NEEDED
Status: DISCONTINUED | OUTPATIENT
Start: 2021-10-06 | End: 2021-10-06 | Stop reason: HOSPADM

## 2021-10-06 RX ORDER — FENTANYL CITRATE 50 UG/ML
INJECTION, SOLUTION INTRAMUSCULAR; INTRAVENOUS AS NEEDED
Status: DISCONTINUED | OUTPATIENT
Start: 2021-10-06 | End: 2021-10-06 | Stop reason: HOSPADM

## 2021-10-06 RX ORDER — FENTANYL CITRATE 50 UG/ML
25 INJECTION, SOLUTION INTRAMUSCULAR; INTRAVENOUS
Status: DISCONTINUED | OUTPATIENT
Start: 2021-10-06 | End: 2021-10-06 | Stop reason: HOSPADM

## 2021-10-06 RX ORDER — NALOXONE HYDROCHLORIDE 0.4 MG/ML
0.04 INJECTION, SOLUTION INTRAMUSCULAR; INTRAVENOUS; SUBCUTANEOUS
Status: DISCONTINUED | OUTPATIENT
Start: 2021-10-06 | End: 2021-10-06 | Stop reason: HOSPADM

## 2021-10-06 RX ORDER — HYDROMORPHONE HYDROCHLORIDE 1 MG/ML
.25-1 INJECTION, SOLUTION INTRAMUSCULAR; INTRAVENOUS; SUBCUTANEOUS
Status: DISCONTINUED | OUTPATIENT
Start: 2021-10-06 | End: 2021-10-06 | Stop reason: HOSPADM

## 2021-10-06 RX ADMIN — MIDAZOLAM 2 MG: 1 INJECTION, SOLUTION INTRAMUSCULAR; INTRAVENOUS at 08:30

## 2021-10-06 RX ADMIN — FENTANYL CITRATE 50 MCG: 50 INJECTION, SOLUTION INTRAMUSCULAR; INTRAVENOUS at 08:37

## 2021-10-06 RX ADMIN — DEXAMETHASONE SODIUM PHOSPHATE 8 MG: 4 INJECTION, SOLUTION INTRAMUSCULAR; INTRAVENOUS at 08:44

## 2021-10-06 RX ADMIN — LIDOCAINE HYDROCHLORIDE 40 MG: 20 INJECTION, SOLUTION EPIDURAL; INFILTRATION; INTRACAUDAL; PERINEURAL at 08:37

## 2021-10-06 RX ADMIN — PROPOFOL 200 MG: 10 INJECTION, EMULSION INTRAVENOUS at 08:37

## 2021-10-06 RX ADMIN — ONDANSETRON HYDROCHLORIDE 4 MG: 2 SOLUTION INTRAMUSCULAR; INTRAVENOUS at 08:44

## 2021-10-06 RX ADMIN — SODIUM CHLORIDE, POTASSIUM CHLORIDE, SODIUM LACTATE AND CALCIUM CHLORIDE 125 ML/HR: 600; 310; 30; 20 INJECTION, SOLUTION INTRAVENOUS at 07:11

## 2021-10-06 RX ADMIN — FENTANYL CITRATE 50 MCG: 50 INJECTION, SOLUTION INTRAMUSCULAR; INTRAVENOUS at 09:00

## 2021-10-06 RX ADMIN — KETOROLAC TROMETHAMINE 30 MG: 30 INJECTION, SOLUTION INTRAMUSCULAR; INTRAVENOUS at 09:11

## 2021-10-06 NOTE — ANESTHESIA PREPROCEDURE EVALUATION
Relevant Problems   CARDIOVASCULAR   (+) Essential hypertension      GASTROINTESTINAL   (+) GERD (gastroesophageal reflux disease)   (+) DEL ANGEL (nonalcoholic steatohepatitis)      ENDOCRINE   (+) Obesity       Anesthetic History   No history of anesthetic complications            Review of Systems / Medical History  Patient summary reviewed and pertinent labs reviewed    Pulmonary                Comments: sarcoidosis   Neuro/Psych   Within defined limits           Cardiovascular    Hypertension          Hyperlipidemia         GI/Hepatic/Renal     GERD: well controlled      Liver disease    Comments: DEL ANGEL Endo/Other        Obesity     Other Findings              Physical Exam    Airway  Mallampati: II  TM Distance: 4 - 6 cm  Neck ROM: normal range of motion   Mouth opening: Normal     Cardiovascular  Regular rate and rhythm,  S1 and S2 normal,  no murmur, click, rub, or gallop  Rhythm: regular  Rate: normal         Dental  No notable dental hx       Pulmonary  Breath sounds clear to auscultation               Abdominal  GI exam deferred       Other Findings            Anesthetic Plan    ASA: 3  Anesthesia type: general          Induction: Intravenous  Anesthetic plan and risks discussed with: Patient

## 2021-10-06 NOTE — DISCHARGE INSTRUCTIONS
POSTOPERATIVE INSTRUCTIONS  FOR D&C, HYSTEROSCOPY & NOVASURE    A D&C is a minor procedure. Your recovery from each one of these procedures should be relatively quick and uneventful. There are a few points that we ask you to remember:    1. Absolutely no intercourse, douching or tampon use for 2 weeks. 2. You can expect to have some vaginal bleeding or bloody vaginal discharge for the next 2 to 4 weeks. 3. You may take tub baths after 1 week and showers at any time. 4. You may resume normal everyday activities as you feel able and return to work within 2-5 days after surgery. 5. Notify us if you experience:  a. Heavy vaginal bleeding or foul vaginal discharge  b. Temperature of 101 degrees or greater  c. Severe pelvic or vaginal pain  6. Please call the office today at [phone] to schedule your checkup appointment in 4-6 weeks. Above all, please notify us of a problem if it arises before we see you again. In an emergency, you may contact a doctor 24 hours a day at [phone]. Follow up with physician as discussed prior to procedure. DISCHARGE SUMMARY from your Nurse    The following personal items collected during your admission are returned to you:   Dental Appliance: Dental Appliances: None  Vision: Visual Aid: Glasses, With patient  Hearing Aid:    Jewelry: Jewelry: None  Clothing: Clothing: Other (comment) (street clothes)  Other Valuables: Other Valuables: Eyeglasses, With patient  Valuables sent to safe: Personal Items Sent to Safe: none    PATIENT INSTRUCTIONS:    After general anesthesia or intravenous sedation, for 24 hours or while taking prescription Narcotics:  · Limit your activities  · Do not drive and operate hazardous machinery  · Do not make important personal or business decisions  · Do  not drink alcoholic beverages  · If you have not urinated within 8 hours after discharge, please contact your surgeon on call.     Report the following to your surgeon:  · Excessive pain, swelling, redness or odor of or around the surgical area  · Temperature over 100.5  · Nausea and vomiting lasting longer than 4 hours or if unable to take medications  · Any signs of decreased circulation or nerve impairment to extremity: change in color, persistent  numbness, tingling, coldness or increase pain  · Any questions    COUGH AND DEEP BREATHE    Breathing deep and coughing are very important exercises to do after surgery. Deep breathing and coughing open the little air tubes and air sacks in your lungs. You take deep breaths every day. You may not even notice - it is just something you do when you sigh or yawn. It is a natural exercise you do to keep these air passages open. After surgery, take deep breaths and cough, on purpose. Coughing and deep breathing help prevent bronchitis and pneumonia after surgery. If you had chest or belly surgery, use a pillow as a \"hug adwoa\" and hold it tightly to your chest or belly when you cough. DIRECTIONS:  · Take 10 to 15 slow deep breaths every hour while awake. · Breathe in deeply, and hold it for 2 seconds. · Exhale slowly through puckered lips, like blowing up a balloon. · After every 4th or 5th deep breath, hug your pillow to your chest or belly and give a hard, deep cough. Yes, it will probably hurt. But doing this exercise is very important part of healing after surgery. Take your pain medicine to help you do this exercise without too much pain. IF YOU HAVE BEEN DIAGNOSED WITH SLEEP APNEA, PLEASE USE YOUR SLEEP APNEA DEVICE OR CPAP MACHINE WHEN YOU INTEND TO NAP AFTER TAKING PAIN MEDICATION. Ankle Pumps    Ankle pumps increase the circulation of oxygenated blood to your lower extremities and decrease your risk for circulation problems such as blood clots.  They also stretch the muscles, tendons and ligaments in your foot and ankle, and prevent joint contracture in the ankle and foot, especially after surgeries on the legs.    It is important to do ankle pump exercises regularly after surgery because immobility increases your risk for developing a blood clot. Your doctor may also have you take an Aspirin for the next few days as well. If your doctor did not ask you to take an Aspirin, consult with him before starting Aspirin therapy on your own. Slowly point your foot forward, feeling the muscles on the top of your lower leg stretch, and hold this position for 5 seconds. Next, pull your foot back toward you as far as possible, stretching the calf muscles, and hold that position for 5 seconds. Repeat with the other foot. Perform 10 repetitions every hour while awake for both ankles if possible (down and then up with the foot once is one repetition). You should feel gentle stretching of the muscles in your lower leg when doing this exercise. If you feel pain, or your range of motion is limited, don't  Push too hard. Only go the limit your joint and muscles will let you go. If you have increasing pain, progressively worsening leg warmth or swelling, STOP the exercise and call your doctor. Below is information about the medications your doctor is prescribing after your visit:    Other information in your discharge envelope:  []     PRESCRIPTIONS  []     SCHOOL/WORK NOTE  []     INFECTION PREVENTION  []     [address]  []     REGIONAL NERVE BLOCK ON QUE PAMPHLET   []     EXPAREL  []     HANDICAP APPLICATION         These are general instructions for a healthy lifestyle:    *  Please give a list of your current medications to your Primary Care Provider. *  Please update this list whenever your medications are discontinued, doses are      changed, or new medications (including over-the-counter products) are added. *  Please carry medication information at all times in case of emergency situations.     About Smoking  No smoking / No tobacco products / Avoid exposure to second hand smoke    Surgeon General's Warning:  Quitting smoking now greatly reduces serious risk to your health. Obesity, smoking, and sedentary lifestyle greatly increases your risk for illness and disease. A healthy diet, regular physical exercise & weight monitoring are important for maintaining a healthy lifestyle. Congestive Heart Failure  You may be retaining fluid if you have a history of heart failure or if you experience any of the following symptoms:  Weight gain of 3 pounds or more overnight or 5 pounds in a week, increased swelling in our hands or feet or shortness of breath while lying flat in bed. Please call your doctor as soon as you notice any of these symptoms; do not wait until your next office visit. Recognize signs and symptoms of STROKE:  F - face looks uneven  A - arms unable to move or move even  S - speech slurred or non-existent  T - time-call 911 as soon as signs and symptoms begin-DO NOT go         Back to bed or wait to see if you get better-TIME IS BRAIN. Warning signs of HEART ATTACK  Call 911 if you have these symptoms    · Chest discomfort. Most heart attacks involve discomfort in the center of the chest that lasts more than a few minutes, or that goes away and comes back. It can feel like uncomfortable pressure, squeezing, fullness, or pain. · Discomfort in other areas of the upper body. Symptoms can include pain or discomfort in one or both        Arms, the back, neck, jaw, or stomach. ·  Shortness of breath with or without chest discomfort. · Other signs may include breaking out in a cold sweat, nausea, or lightheadedness    Don't wait more than five minutes to call 911 - MINUTES MATTER! Fast action can save your life. Calling 911 is almost always the fastest way to get lifesaving treatment. Emergency Medical Services staff can begin treatment when they arrive - up to an hour sooner than if someone gets to the hospital by car.       Lionel Craig MEDICATION AND SIDE EFFECT GUIDE    [facility] MEDICATION AND SIDE EFFECT GUIDE was provided to the PATIENT AND CARE PROVIDER.   Information provided includes instruction about drug purpose and common side effects for the following medications:    · No prescriptions provided at discharge

## 2021-10-06 NOTE — BRIEF OP NOTE
Brief Postoperative Note    Patient: Sonia Alexandre  YOB: 1965  MRN: [id number]    Date of Procedure: 10/6/2021     Pre-Op Diagnosis: POST MENOPAUSAL BLEEDING    Post-Op Diagnosis: Same as preoperative diagnosis. Endometrial polyp    Procedure(s):   HYSTEROSCOPY, DILATION AND CURETTAGE POLYPECTOMY WITH MYOSURE    Surgeon(s):  Nydia Leonard MD    Surgical Assistant: None    Anesthesia: General     Estimated Blood Loss (mL): Minimal    Complications: None    Specimens:   ID Type Source Tests Collected by Time Destination   1 : ENDOMETRIAL POLYP AND CURETTINGS  Preservative Endometrial Curetting  Nydia Leonard MD 10/6/2021 5291 Pathology   2 : ENDOCERVICAL CURETTINGS Preservative Endocervical Curetting  Nydia Leonard MD 10/6/2021 0912 Pathology        Implants: * No implants in log *    Drains: * No LDAs found *    Findings: endometrial polyp on small stalk, fluid deficit 125 cc    Electronically Signed by Ondina Baker MD on 10/6/2021 at 10:02 AM

## 2021-10-06 NOTE — ANESTHESIA POSTPROCEDURE EVALUATION
Procedure(s): HYSTEROSCOPY, DILATION AND CURETTAGE POLYPECTOMY WITH MYOSURE.    general    Anesthesia Post Evaluation      Multimodal analgesia: multimodal analgesia not used between 6 hours prior to anesthesia start to PACU discharge  Patient location during evaluation: PACU  Patient participation: complete - patient participated  Level of consciousness: awake  Pain management: adequate  Airway patency: patent  Anesthetic complications: no  Cardiovascular status: acceptable, blood pressure returned to baseline and hemodynamically stable  Respiratory status: acceptable  Hydration status: acceptable  Post anesthesia nausea and vomiting:  controlled      INITIAL Post-op Vital signs:   Vitals Value Taken Time   /37 10/06/21 0935   Temp 36.4 °C (97.6 °F) 10/06/21 0936   Pulse 56 10/06/21 0940   Resp 11 10/06/21 0940   SpO2 97 % 10/06/21 0940   Vitals shown include unvalidated device data.

## 2021-10-06 NOTE — PERIOP NOTES
Discharge instructions reviewed with pt's mother.  Opportunity for questions provided and answered Discuss treatment with OTC Flonase and OTC Claritin

## 2021-10-07 ENCOUNTER — TELEPHONE (OUTPATIENT)
Dept: OBGYN CLINIC | Age: 56
End: 2021-10-07

## 2021-10-07 NOTE — TELEPHONE ENCOUNTER
Pt calling to see if it is ok to restart her vitamins that she was told to stop for her procedure yesterday. This RN advised pt it is ok to restart her vitamins. Pt verbalized understanding.

## 2021-10-09 NOTE — OP NOTES
[facility] 77  OPERATIVE REPORT    Name:  Gonzalo Musa  MR#:  [id number]  :  1965  ACCOUNT #:  [de-identified]  DATE OF SERVICE:  10/06/2021    PREOPERATIVE DIAGNOSIS:  Postmenopausal bleeding. POSTOPERATIVE DIAGNOSES:  Postmenopausal bleeding, endometrial polyp. PROCEDURES PERFORMED:  Diagnostic hysteroscopy, fractional dilatation and curettage with MyoSure device, removal of endometrial polyp, endocervical curettage. SURGEON:  Shea Wilson MD    ASSISTANT:  None. ANESTHESIA:  General.    COMPLICATIONS:  None. SPECIMENS REMOVED:  Endocervical and endometrial curettings. IMPLANTS:  none    ESTIMATED BLOOD LOSS:  Less than 50 mL. TUBES AND DRAINS:  None. PROCEDURE:  After proper patient identification and consent were obtained, the patient was taken to the operating room where after sufficient induction of general anesthesia, she was placed in the dorsal lithotomy position and prepped and draped in the usual sterile fashion. Examination under anesthesia revealed a normal-sized uterus. A speculum was placed in the vagina. The anterior lip of the cervix was grasped with a single-tooth tenaculum. The cervix was then dilated with Hegar dilators in order to admit the hysteroscope. Prior to placement of the hysteroscope, an endocervical curettage was done using a small curette and curetting the entire endocervix and sending a small sample for pathology. Once the hysteroscope was inserted into the endometrial cavity, it was distended with normal saline. Both tubal ostia were identified. There was one single endometrial polyp noted coming off the left side. The MyoSure device was introduced. The polyp was removed. The cavity was sampled under direct visualization throughout during the procedure. Hemostasis was noted. All the instruments were removed. Pad, needle and sponge count were correct x2. Fluid deficit was 125 mL.   The patient was awakened from anesthesia, went to Recovery in stable condition.       Emelia Craey MD      [id number]  D:  10/08/2021 17:21  T:  10/09/2021 3:14  JOB #:  [id number]

## 2021-11-16 NOTE — PROGRESS NOTES
Chief Complaint   Follow-up      HPI  Mikael Dietrich is a 60 y.o. female who presents for the evaluation of follow up to hysteroscopy performed 10/6/2021. Patient denies pain or bleeding. She had not had period in 3 years prior to this past episode of bleeding    Pathology: secretory endometrium - large polyp at hysteroscopy     Patient's last menstrual period was 01/24/2017 (exact date). Past Medical History:   Diagnosis Date    Celiac disease 10/29/2015    Contact dermatitis and other eczema, due to unspecified cause     GERD (gastroesophageal reflux disease) 11/3/2014    HTN (hypertension) 2/27/2013    Hypercholesterolemia 10/31/2017    DEL ANGEL (nonalcoholic steatohepatitis) 10/29/2015    Rosacea 8/31/2011    Sarcoidosis 2007    Vitamin D deficiency 8/31/2011     Past Surgical History:   Procedure Laterality Date    HX OTHER SURGICAL      Biospy of lymph nodes chest    HX WISDOM TEETH EXTRACTION       Social History     Occupational History    Not on file   Tobacco Use    Smoking status: Never Smoker    Smokeless tobacco: Never Used   Vaping Use    Vaping Use: Never used   Substance and Sexual Activity    Alcohol use: No     Alcohol/week: 0.0 standard drinks    Drug use: No    Sexual activity: Never     Family History   Problem Relation Age of Onset    Cancer Father     Hypertension Father     Breast Cancer Maternal Grandmother     Diabetes Maternal Grandmother     Stroke Maternal Grandmother     Cancer Paternal Grandmother     Cancer Paternal Grandfather     Hypertension Mother        Allergies   Allergen Reactions    Clindamycin Rash    Erythromycin Other (comments)     Causes stomach pain    Flagyl [Metronidazole] Nausea Only    Pcn [Penicillins] Rash     Prior to Admission medications    Medication Sig Start Date End Date Taking? Authorizing Provider   cholecalciferol (Vitamin D3) (1000 Units /25 mcg) tablet Take 1,000 Units by mouth daily.     Provider, Historical miSOPROStoL (CYTOTEC) 200 mcg tablet Take 2 by mouth night prior to procedure and 2 by mouth upon waking morning of surgery with a sip only 9/9/21   Jen Marx MD   omeprazole (PRILOSEC) 20 mg capsule take 1 capsule by mouth once daily (TO REPLACE RANITIDINE) 8/14/21   Huy Yu MD   varicella-zoster recombinant, PF, (Shingrix, PF,) 50 mcg/0.5 mL susr injection 0.5 ml IM once; repeat in one to six months  Patient not taking: Reported on 10/6/2021 7/26/21   Huy Yu MD   Spiriva with HandiHaler 18 mcg inhalation capsule inhale the contents of one capsule in the handihaler once daily 4/5/21   Huy Yu MD   montelukast (SINGULAIR) 10 mg tablet take 1 tablet by mouth once daily 4/2/21   Huy Yu MD   metoprolol succinate (TOPROL-XL) 50 mg XL tablet take 1 tablet by mouth once daily 3/4/21   Huy Yu MD   vitamin E (AQUA GEMS) 400 unit capsule Take 400 Units by mouth two (2) times a day. Provider, Historical   calcium-cholecalciferol, d3, (CALCIUM 600 + D) 600-125 mg-unit Tab Take 600 mg by mouth two (2) times a day.     Provider, Historical        Review of Systems: History obtained from the patient  Constitutional: negative for weight loss, fever, night sweats  HEENT: negative for hearing loss, earache, congestion, snoring, sorethroat  CV: negative for chest pain, palpitations, edema  Resp: negative for cough, shortness of breath, wheezing  Breast: negative for breast lumps, nipple discharge, galactorrhea  GI: negative for change in bowel habits, abdominal pain, black or bloody stools  : negative for frequency, dysuria, hematuria, vaginal discharge  MSK: negative for back pain, joint pain, muscle pain  Skin: negative for itching, rash, hives  Neuro: negative for dizziness, headache, confusion, weakness  Psych: negative for anxiety, depression, change in mood  Heme/lymph: negative for bleeding, bruising, pallor    Objective:  Visit Vitals  /80   Wt 196 lb (88.9 kg)   LMP 01/24/2017 (Exact Date)   BMI 35.85 kg/m²       Physical Exam:   PHYSICAL EXAMINATION    Constitutional  · Appearance: well-nourished, well developed, alert, in no acute distress    HENT  · Head and Face: appears normal    Neck  · Inspection/Palpation: normal appearance, no masses or tenderness  · Lymph Nodes: no lymphadenopathy present  · Thyroid: gland size normal, nontender, no nodules or masses present on palpation      Gastrointestinal  · Abdominal Examination: abdomen non-tender to palpation, normal bowel sounds, no masses present  · Liver and spleen: no hepatomegaly present, spleen not palpable  · Hernias: no hernias identified    Genitourinary  · External Genitalia: normal appearance for age, no discharge present, no tenderness present, no inflammatory lesions present, no masses present, no atrophy present  · Vagina: normal vaginal vault without central or paravaginal defects, no discharge present, no inflammatory lesions present, no masses present  · Bladder: non-tender to palpation  · Urethra: appears normal  · Cervix: normal   · Uterus: normal size, shape and consistency  · Adnexa: no adnexal tenderness present, no adnexal masses present  · Perineum: perineum within normal limits, no evidence of trauma, no rashes or skin lesions present  · Anus: anus within normal limits, no hemorrhoids present  · Inguinal Lymph Nodes: no lymphadenopathy present    Skin  · General Inspection: no rash, no lesions identified    Neurologic/Psychiatric  · Mental Status:  · Orientation: grossly oriented to person, place and time  · Mood and Affect: mood normal, affect appropriate    Assessment:   Hx of  bleeding with polyp at hysteroscopy  Path: secretory endometrium    Plan: Will check labs - clinically this was a polyp      RTO prn if symptoms persist or worsen. Instructions given to pt. Handouts given to pt.

## 2021-11-17 ENCOUNTER — OFFICE VISIT (OUTPATIENT)
Dept: OBGYN CLINIC | Age: 56
End: 2021-11-17
Payer: COMMERCIAL

## 2021-11-17 VITALS — WEIGHT: 196 LBS | BODY MASS INDEX: 35.85 KG/M2 | DIASTOLIC BLOOD PRESSURE: 80 MMHG | SYSTOLIC BLOOD PRESSURE: 138 MMHG

## 2021-11-17 DIAGNOSIS — N95.0 POSTMENOPAUSAL BLEEDING: Primary | ICD-10-CM

## 2021-11-17 PROCEDURE — 99213 OFFICE O/P EST LOW 20 MIN: CPT | Performed by: OBSTETRICS & GYNECOLOGY

## 2021-11-18 LAB
ESTRADIOL SERPL-MCNC: 21 PG/ML
FSH SERPL-ACNC: 87.8 MIU/ML
LH SERPL-ACNC: 52.6 MIU/ML

## 2022-01-24 ENCOUNTER — OFFICE VISIT (OUTPATIENT)
Dept: FAMILY MEDICINE CLINIC | Age: 57
End: 2022-01-24
Payer: COMMERCIAL

## 2022-01-24 VITALS
BODY MASS INDEX: 35.7 KG/M2 | SYSTOLIC BLOOD PRESSURE: 136 MMHG | WEIGHT: 194 LBS | OXYGEN SATURATION: 96 % | HEIGHT: 62 IN | DIASTOLIC BLOOD PRESSURE: 67 MMHG | TEMPERATURE: 97.2 F | HEART RATE: 57 BPM

## 2022-01-24 DIAGNOSIS — D86.0 PULMONARY SARCOIDOSIS (HCC): ICD-10-CM

## 2022-01-24 DIAGNOSIS — I10 ESSENTIAL HYPERTENSION: Primary | ICD-10-CM

## 2022-01-24 PROCEDURE — 99214 OFFICE O/P EST MOD 30 MIN: CPT | Performed by: FAMILY MEDICINE

## 2022-01-24 RX ORDER — METOPROLOL SUCCINATE 50 MG/1
50 TABLET, EXTENDED RELEASE ORAL DAILY
Qty: 90 TABLET | Refills: 4 | Status: SHIPPED | OUTPATIENT
Start: 2022-01-24 | End: 2022-07-20 | Stop reason: SDUPTHER

## 2022-01-24 NOTE — PROGRESS NOTES
Identified pt with two pt identifiers(name and ). Reviewed record in preparation for visit and have obtained necessary documentation. Chief Complaint   Patient presents with    Follow-up    Hypertension        Vitals:    22 1442   BP: 136/67   Pulse: (!) 57   Temp: 97.2 °F (36.2 °C)   TempSrc: Temporal   SpO2: 96%   Weight: 194 lb (88 kg)   Height: 5' 2\" (1.575 m)   PainSc:   0 - No pain   LMP: 2017       Health Maintenance Due   Topic    Shingrix Vaccine Age 50> (1 of 2)    COVID-19 Vaccine (3 - Booster for [employer] series)       Coordination of Care Questionnaire:  :   1) Have you been to an emergency room, urgent care, or hospitalized since your last visit? If yes, where when, and reason for visit? No      2. Have seen or consulted any other health care provider since your last visit? If yes, where when, and reason for visit?   No

## 2022-01-24 NOTE — PROGRESS NOTES
HISTORY OF PRESENT ILLNESS  Chai Cabezas is a 59 y.o. female. Chai Cabezas is here to follow up on their HTN. She needs to see Pulmonary for her pulmonary sarcoidosis and has a referral.      Review of Systems   Eyes: Negative for blurred vision. Respiratory: Negative for shortness of breath. Cardiovascular: Negative for chest pain. Neurological: Negative for dizziness, sensory change, speech change, focal weakness and headaches. Visit Vitals  /67 (BP 1 Location: Left upper arm, BP Patient Position: Sitting, BP Cuff Size: Large adult)   Pulse (!) 57   Temp 97.2 °F (36.2 °C) (Temporal)   Ht 5' 2\" (1.575 m)   Wt 194 lb (88 kg)   LMP 01/24/2017 (Exact Date)   SpO2 96%   BMI 35.48 kg/m²     Physical Exam  Vitals and nursing note reviewed. Constitutional:       General: She is not in acute distress. Appearance: She is well-developed. She is not diaphoretic. Cardiovascular:      Rate and Rhythm: Normal rate and regular rhythm. Heart sounds: Normal heart sounds. No murmur heard. No friction rub. No gallop. Pulmonary:      Effort: Pulmonary effort is normal. No respiratory distress. Breath sounds: Normal breath sounds. No wheezing or rales. Skin:     General: Skin is warm and dry. Neurological:      Mental Status: She is alert and oriented to person, place, and time. ASSESSMENT and PLAN    ICD-10-CM ICD-9-CM    1. Essential hypertension  I10 401.9 metoprolol succinate (TOPROL-XL) 50 mg XL tablet   2. [facility]  D86.0 135      517.8         Blood pressure controlled  Continue current plans. Refills per orders  Schedule with Pulmonary    Follow-up and Dispositions    · Return in about 6 months (around 7/24/2022) for blood pressure. Reviewed plan of care. Patient has provided input and agrees with goals.

## 2022-02-03 LAB
ALBUMIN SERPL-MCNC: 4.3 G/DL (ref 3.8–4.9)
ALP SERPL-CCNC: 148 IU/L (ref 44–121)
ALT SERPL-CCNC: 39 IU/L (ref 0–32)
AST SERPL-CCNC: 29 IU/L (ref 0–40)
BILIRUB DIRECT SERPL-MCNC: <0.1 MG/DL (ref 0–0.4)
BILIRUB SERPL-MCNC: 0.2 MG/DL (ref 0–1.2)
BUN SERPL-MCNC: 14 MG/DL (ref 6–24)
BUN/CREAT SERPL: 18 (ref 9–23)
CALCIUM SERPL-MCNC: 9.5 MG/DL (ref 8.7–10.2)
CHLORIDE SERPL-SCNC: 104 MMOL/L (ref 96–106)
CO2 SERPL-SCNC: 24 MMOL/L (ref 20–29)
CREAT SERPL-MCNC: 0.8 MG/DL (ref 0.57–1)
GLUCOSE SERPL-MCNC: 85 MG/DL (ref 65–99)
POTASSIUM SERPL-SCNC: 4 MMOL/L (ref 3.5–5.2)
PROT SERPL-MCNC: 7.6 G/DL (ref 6–8.5)
SODIUM SERPL-SCNC: 145 MMOL/L (ref 134–144)

## 2022-03-19 PROBLEM — E78.00 HYPERCHOLESTEROLEMIA: Status: ACTIVE | Noted: 2017-10-31

## 2022-04-04 RX ORDER — TIOTROPIUM BROMIDE 18 UG/1
CAPSULE ORAL; RESPIRATORY (INHALATION)
Qty: 90 CAPSULE | Refills: 3 | Status: SHIPPED | OUTPATIENT
Start: 2022-04-04

## 2022-04-04 RX ORDER — MONTELUKAST SODIUM 10 MG/1
TABLET ORAL
Qty: 90 TABLET | Refills: 3 | Status: SHIPPED | OUTPATIENT
Start: 2022-04-04

## 2022-07-20 ENCOUNTER — OFFICE VISIT (OUTPATIENT)
Dept: FAMILY MEDICINE CLINIC | Age: 57
End: 2022-07-20
Payer: COMMERCIAL

## 2022-07-20 VITALS
DIASTOLIC BLOOD PRESSURE: 71 MMHG | WEIGHT: 196 LBS | BODY MASS INDEX: 36.07 KG/M2 | HEIGHT: 62 IN | TEMPERATURE: 98.1 F | SYSTOLIC BLOOD PRESSURE: 133 MMHG | HEART RATE: 69 BPM | OXYGEN SATURATION: 97 %

## 2022-07-20 DIAGNOSIS — I10 ESSENTIAL HYPERTENSION: Primary | ICD-10-CM

## 2022-07-20 DIAGNOSIS — E78.00 HYPERCHOLESTEROLEMIA: ICD-10-CM

## 2022-07-20 PROCEDURE — 99214 OFFICE O/P EST MOD 30 MIN: CPT | Performed by: FAMILY MEDICINE

## 2022-07-20 RX ORDER — METOPROLOL SUCCINATE 50 MG/1
50 TABLET, EXTENDED RELEASE ORAL DAILY
Qty: 90 TABLET | Refills: 4 | Status: SHIPPED | OUTPATIENT
Start: 2022-07-20

## 2022-07-20 NOTE — PROGRESS NOTES
HISTORY OF PRESENT ILLNESS  Everardo Lopez is a 59 y.o. female. Patient presents with: Follow-up  Hypertension    She also needs lipid follow up. Review of Systems   Eyes:  Negative for blurred vision. Respiratory:  Negative for shortness of breath. Cardiovascular:  Negative for chest pain. Neurological:  Negative for dizziness, sensory change, speech change, focal weakness and headaches. Visit Vitals  /71 (BP 1 Location: Left upper arm, BP Patient Position: Sitting, BP Cuff Size: Adult)   Pulse 69   Temp 98.1 °F (36.7 °C) (Temporal)   Ht 5' 2\" (1.575 m)   Wt 196 lb (88.9 kg)   LMP 01/24/2017 (Exact Date)   SpO2 97%   BMI 35.85 kg/m²     Physical Exam  Vitals and nursing note reviewed. Constitutional:       General: She is not in acute distress. Appearance: She is well-developed. She is not diaphoretic. Cardiovascular:      Rate and Rhythm: Normal rate and regular rhythm. Heart sounds: Normal heart sounds. No murmur heard. No friction rub. No gallop. Pulmonary:      Effort: Pulmonary effort is normal. No respiratory distress. Breath sounds: Normal breath sounds. No wheezing or rales. Skin:     General: Skin is warm and dry. Neurological:      Mental Status: She is alert and oriented to person, place, and time. ASSESSMENT and PLAN    ICD-10-CM ICD-9-CM    1. Essential hypertension  X65 390.9 METABOLIC PANEL, BASIC      MICROALBUMIN, UR, RAND W/ MICROALB/CREAT RATIO      METABOLIC PANEL, BASIC      MICROALBUMIN, UR, RAND W/ MICROALB/CREAT RATIO      2. Hypercholesterolemia  E78.00 272.0 LIPID PANEL      HEPATIC FUNCTION PANEL      LIPID PANEL      HEPATIC FUNCTION PANEL          Blood pressure controlled  Labs per orders. Continue current plans. Follow-up and Dispositions    Return in about 6 months (around 1/20/2023) for physical.           Reviewed plan of care. Patient has provided input and agrees with goals.

## 2022-07-20 NOTE — PROGRESS NOTES
Identified pt with two pt identifiers. Reviewed record in preparation for visit and have obtained necessary documentation. All patient medications has been reviewed. Chief Complaint   Patient presents with    Follow-up    Hypertension     Additional information about chief complaint:    Visit Vitals  /71 (BP 1 Location: Left upper arm, BP Patient Position: Sitting, BP Cuff Size: Adult)   Pulse 69   Temp 98.1 °F (36.7 °C) (Temporal)   Ht 5' 2\" (1.575 m)   Wt 196 lb (88.9 kg)   SpO2 97%   BMI 35.85 kg/m²       Health Maintenance Due   Topic    Shingrix Vaccine Age 50> (1 of 2)    COVID-19 Vaccine (3 - Booster for [employer] series)       1. Have you been to the ER, urgent care clinic since your last visit? Hospitalized since your last visit? No    2. Have you seen or consulted any other health care providers outside of the [address] [place] [address] since your last visit? Include any pap smears or colon screening.  No

## 2022-10-24 DIAGNOSIS — K21.9 GASTROESOPHAGEAL REFLUX DISEASE: ICD-10-CM

## 2022-10-24 RX ORDER — OMEPRAZOLE 20 MG/1
CAPSULE, DELAYED RELEASE ORAL
Qty: 90 CAPSULE | Refills: 3 | Status: SHIPPED | OUTPATIENT
Start: 2022-10-24

## 2023-01-18 ENCOUNTER — OFFICE VISIT (OUTPATIENT)
Dept: FAMILY MEDICINE CLINIC | Age: 58
End: 2023-01-18
Payer: COMMERCIAL

## 2023-01-18 VITALS
HEART RATE: 98 BPM | OXYGEN SATURATION: 95 % | HEIGHT: 62 IN | SYSTOLIC BLOOD PRESSURE: 140 MMHG | BODY MASS INDEX: 36.44 KG/M2 | TEMPERATURE: 97.8 F | WEIGHT: 198 LBS | DIASTOLIC BLOOD PRESSURE: 78 MMHG

## 2023-01-18 DIAGNOSIS — Z12.31 ENCOUNTER FOR SCREENING MAMMOGRAM FOR MALIGNANT NEOPLASM OF BREAST: ICD-10-CM

## 2023-01-18 DIAGNOSIS — D86.0 PULMONARY SARCOIDOSIS (HCC): ICD-10-CM

## 2023-01-18 DIAGNOSIS — Z00.00 ROUTINE GENERAL MEDICAL EXAMINATION AT A HEALTH CARE FACILITY: Primary | ICD-10-CM

## 2023-01-18 DIAGNOSIS — I10 ESSENTIAL HYPERTENSION: ICD-10-CM

## 2023-01-18 DIAGNOSIS — Z12.11 SCREEN FOR COLON CANCER: ICD-10-CM

## 2023-01-18 PROCEDURE — 3078F DIAST BP <80 MM HG: CPT | Performed by: FAMILY MEDICINE

## 2023-01-18 PROCEDURE — 99396 PREV VISIT EST AGE 40-64: CPT | Performed by: FAMILY MEDICINE

## 2023-01-18 PROCEDURE — 3077F SYST BP >= 140 MM HG: CPT | Performed by: FAMILY MEDICINE

## 2023-01-18 RX ORDER — METOPROLOL SUCCINATE 100 MG/1
50 TABLET, EXTENDED RELEASE ORAL DAILY
Qty: 90 TABLET | Refills: 0 | Status: SHIPPED | OUTPATIENT
Start: 2023-01-18

## 2023-01-18 NOTE — PROGRESS NOTES
Identified pt with two pt identifiers. Reviewed record in preparation for visit and have obtained necessary documentation. All patient medications has been reviewed. Chief Complaint   Patient presents with    Complete Physical    Well Woman     Additional information about chief complaint:    Visit Vitals  BP (!) 140/78 (BP 1 Location: Left upper arm, BP Patient Position: Sitting, BP Cuff Size: Large adult)   Pulse 98   Temp 97.8 °F (36.6 °C) (Temporal)   Ht 5' 2\" (1.575 m)   Wt 198 lb (89.8 kg)   SpO2 95%   BMI 36.21 kg/m²       Health Maintenance Due   Topic    Shingles Vaccine (1 of 2)    COVID-19 Vaccine (3 - Booster for [employer] series)    Flu Vaccine (1)       1. Have you been to the ER, urgent care clinic since your last visit? Hospitalized since your last visit? no    2. Have you seen or consulted any other health care providers outside of the [address] [place] [address] since your last visit? Include any pap smears or colon screening.  no

## 2023-01-18 NOTE — PROGRESS NOTES
Subjective:   Yahir Banerjee is a 59 y.o. y.o. female here for her annual routine checkup. Patient's last menstrual period was 01/24/2017 (exact date). She is not seeing anyone for her Pulmonary sarcoidosis. Social History: not sexually active. Pertinent past medical hstory: hypertension, no history of DVT, CAD, DM, liver disease, migraines or smoking. 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 08/31/2011     Current Outpatient Medications   Medication Sig Dispense Refill    omeprazole (PRILOSEC) 20 mg capsule take 1 capsule by mouth once daily (TO REPLACE RANITIDINE) 90 Capsule 3    metoprolol succinate (TOPROL-XL) 50 mg XL tablet Take 1 Tablet by mouth in the morning. 90 Tablet 4    montelukast (SINGULAIR) 10 mg tablet take 1 tablet by mouth once daily 90 Tablet 3    Spiriva with HandiHaler 18 mcg inhalation capsule inhale THE CONTENTS OF ONE CAPSULE IN THE HANDIHALER once daily 90 Capsule 3    cholecalciferol (VITAMIN D3) (1000 Units /25 mcg) tablet Take 1,000 Units by mouth daily. vitamin E (AQUA GEMS) 400 unit capsule Take 400 Units by mouth two (2) times a day. calcium-cholecalciferol, d3, 600-125 mg-unit tab Take 600 mg by mouth two (2) times a day.        Allergies   Allergen Reactions    Clindamycin Rash    Erythromycin Other (comments)     Causes stomach pain    Flagyl [Metronidazole] Nausea Only    Pcn [Penicillins] Rash     Past Medical History:   Diagnosis Date    Celiac disease 10/29/2015    Contact dermatitis and other eczema, due to unspecified cause     GERD (gastroesophageal reflux disease) 11/3/2014    HTN (hypertension) 2/27/2013    Hypercholesterolemia 10/31/2017    DEL ANGEL (nonalcoholic steatohepatitis) 10/29/2015    Rosacea 8/31/2011    Sarcoidosis 2007    Vitamin D deficiency 8/31/2011     Past Surgical History:   Procedure Laterality Date    HX OTHER SURGICAL      Biospy of lymph nodes chest    HX WISDOM TEETH EXTRACTION       Family History   Problem Relation Age of Onset    Cancer Father     Hypertension Father     Breast Cancer Maternal Grandmother     Diabetes Maternal Grandmother     Stroke Maternal Grandmother     Cancer Paternal Grandmother     Cancer Paternal Grandfather     Hypertension Mother      Social History     Tobacco Use    Smoking status: Never    Smokeless tobacco: Never   Substance Use Topics    Alcohol use: No     Alcohol/week: 0.0 standard drinks        ROS:  Feeling well. No dyspnea or chest pain on exertion. No abdominal pain, change in bowel habits, black or bloody stools. No urinary tract symptoms. GYN ROS: no menses, no abnormal bleeding, pelvic pain or discharge, no breast pain or new or enlarging lumps on self exam. No neurological complaints. Objective:  Visit Vitals  BP (!) 140/78 (BP 1 Location: Left upper arm, BP Patient Position: Sitting, BP Cuff Size: Large adult)   Pulse 98   Temp 97.8 °F (36.6 °C) (Temporal)   Ht 5' 2\" (1.575 m)   Wt 198 lb (89.8 kg)   LMP 01/24/2017 (Exact Date)   SpO2 95%   BMI 36.21 kg/m²     The patient appears well, alert, oriented x 3, in no distress. ENT normal.  Neck supple. No adenopathy or thyromegaly. VITOR. Lungs are clear, good air entry, no wheezes, rhonchi or rales. S1 and S2 normal, no murmurs, regular rate and rhythm. Abdomen soft without tenderness, guarding, mass or organomegaly.  Extremities show no edema, normal peripheral pulses. Neurological is normal, no focal findings. BREAST EXAM: breasts appear normal, no suspicious masses, no skin or nipple changes or axillary nodes    PELVIC EXAM: examination not indicated    Assessment/Plan:    ICD-10-CM ICD-9-CM    1. Routine general medical examination at a health care facility  Z00.00 V70.0       2. Essential hypertension  I10 401.9 metoprolol succinate (TOPROL-XL) 100 mg tablet      3. Pulmonary sarcoidosis ([facility]  D86.0 135 REFERRAL TO PULMONARY DISEASE     517.8       4. Encounter for screening mammogram for malignant neoplasm of breast  Z12.31 V76.12 PRISCILLA 3D ALDAIR W MAMMO BI SCREENING INCL CAD      5. Screen for colon cancer  Z12.11 V76.51 REFERRAL TO GASTROENTEROLOGY            Regular exercise  Increase Toprol XL  Pulmonary referral  Mammogram  Colonoscopy     Follow-up and Dispositions    Return in about 6 weeks (around 3/1/2023) for blood pressure. .      Reviewed plan of care. Patient has provided input and agrees with goals.

## 2023-01-22 ENCOUNTER — TELEPHONE (OUTPATIENT)
Dept: FAMILY MEDICINE CLINIC | Age: 58
End: 2023-01-22

## 2023-01-22 DIAGNOSIS — R79.89 ELEVATED LFTS: Primary | ICD-10-CM

## 2023-01-23 NOTE — TELEPHONE ENCOUNTER
Please call patient and let her know her bad cholesterol is high and her liver function tests are elevated. I would like for her to have additional lab tests and a liver ultrasound. I have printed orders. Also, please schedule for a visit about her cholesterol. Tierra Barnes

## 2023-01-23 NOTE — TELEPHONE ENCOUNTER
Called and spoke with pt, and she has been advised and states understanding of results and agrees with plan. Pt has been scheduled.

## 2023-01-24 LAB
ANA SER QL: POSITIVE
CENTROMERE B AB SER-ACNC: <0.2 AI (ref 0–0.9)
CHROMATIN AB SERPL-ACNC: <0.2 AI (ref 0–0.9)
DSDNA AB SER-ACNC: 2 IU/ML (ref 0–9)
ENA JO1 AB SER-ACNC: <0.2 AI (ref 0–0.9)
ENA RNP AB SER-ACNC: 1.6 AI (ref 0–0.9)
ENA SCL70 AB SER-ACNC: <0.2 AI (ref 0–0.9)
ENA SM AB SER-ACNC: <0.2 AI (ref 0–0.9)
ENA SS-A AB SER-ACNC: <0.2 AI (ref 0–0.9)
ENA SS-B AB SER-ACNC: <0.2 AI (ref 0–0.9)
FERRITIN SERPL-MCNC: 90 NG/ML (ref 15–150)
HBV CORE AB SERPL QL IA: NEGATIVE
HBV CORE IGM SERPL QL IA: NEGATIVE
HBV SURFACE AG SERPL QL IA: NEGATIVE
HCV AB S/CO SERPL IA: <0.1 S/CO RATIO (ref 0–0.9)
HCV AB SERPL QL IA: NORMAL
MITOCHONDRIA M2 IGG SER-ACNC: <20 UNITS (ref 0–20)
SEE BELOW, 164869: ABNORMAL

## 2023-01-25 ENCOUNTER — HOSPITAL ENCOUNTER (OUTPATIENT)
Dept: ULTRASOUND IMAGING | Age: 58
Discharge: HOME | End: 2023-01-25
Attending: FAMILY MEDICINE
Payer: COMMERCIAL

## 2023-01-25 ENCOUNTER — HOSPITAL ENCOUNTER (OUTPATIENT)
Dept: MAMMOGRAPHY | Age: 58
Discharge: HOME | End: 2023-01-25
Attending: FAMILY MEDICINE
Payer: COMMERCIAL

## 2023-01-25 DIAGNOSIS — R79.89 ELEVATED LFTS: ICD-10-CM

## 2023-01-25 DIAGNOSIS — Z12.31 ENCOUNTER FOR SCREENING MAMMOGRAM FOR MALIGNANT NEOPLASM OF BREAST: ICD-10-CM

## 2023-01-25 PROCEDURE — 76700 US EXAM ABDOM COMPLETE: CPT

## 2023-01-25 PROCEDURE — 77063 BREAST TOMOSYNTHESIS BI: CPT

## 2023-01-26 ENCOUNTER — TELEPHONE (OUTPATIENT)
Dept: FAMILY MEDICINE CLINIC | Age: 58
End: 2023-01-26

## 2023-01-26 DIAGNOSIS — K76.0 FATTY LIVER: Primary | ICD-10-CM

## 2023-02-08 ENCOUNTER — OFFICE VISIT (OUTPATIENT)
Dept: FAMILY MEDICINE CLINIC | Age: 58
End: 2023-02-08
Payer: COMMERCIAL

## 2023-02-08 VITALS
HEIGHT: 62 IN | TEMPERATURE: 97.8 F | WEIGHT: 198 LBS | BODY MASS INDEX: 36.44 KG/M2 | SYSTOLIC BLOOD PRESSURE: 125 MMHG | OXYGEN SATURATION: 97 % | DIASTOLIC BLOOD PRESSURE: 60 MMHG | HEART RATE: 78 BPM

## 2023-02-08 DIAGNOSIS — R76.8 POSITIVE ANA (ANTINUCLEAR ANTIBODY): ICD-10-CM

## 2023-02-08 DIAGNOSIS — I10 ESSENTIAL HYPERTENSION: ICD-10-CM

## 2023-02-08 DIAGNOSIS — E66.01 SEVERE OBESITY (BMI 35.0-39.9) WITH COMORBIDITY (HCC): ICD-10-CM

## 2023-02-08 DIAGNOSIS — K75.81 NASH (NONALCOHOLIC STEATOHEPATITIS): Primary | ICD-10-CM

## 2023-02-08 PROCEDURE — 99214 OFFICE O/P EST MOD 30 MIN: CPT | Performed by: FAMILY MEDICINE

## 2023-02-08 PROCEDURE — 3078F DIAST BP <80 MM HG: CPT | Performed by: FAMILY MEDICINE

## 2023-02-08 PROCEDURE — 3074F SYST BP LT 130 MM HG: CPT | Performed by: FAMILY MEDICINE

## 2023-02-08 RX ORDER — FLUTICASONE PROPIONATE AND SALMETEROL 250; 50 UG/1; UG/1
POWDER RESPIRATORY (INHALATION)
COMMUNITY
Start: 2023-02-07

## 2023-02-08 RX ORDER — ALBUTEROL SULFATE 90 UG/1
AEROSOL, METERED RESPIRATORY (INHALATION)
COMMUNITY
Start: 2023-02-07

## 2023-02-08 RX ORDER — METOPROLOL SUCCINATE 100 MG/1
100 TABLET, EXTENDED RELEASE ORAL DAILY
Qty: 90 TABLET | Refills: 4 | Status: SHIPPED | OUTPATIENT
Start: 2023-02-08

## 2023-02-08 NOTE — PROGRESS NOTES
HISTORY OF PRESENT ILLNESS  Dileep Ralph is a 59 y.o. female. Patient presents with:  Results: Labs and US    Her SHANICE was positive and her ultrasound read:    Hepatic steatosis. Otherwise unremarkable. She is obese. Evidently, she was losing weight before the pandemic but has gotten \"off track\". She is not interested in weight loss medication. Review of Systems   Respiratory:  Negative for cough. Musculoskeletal:  Negative for joint pain. No joint swelling   Skin:  Negative for rash. No joint redness     Visit Vitals  /60 (BP 1 Location: Left upper arm, BP Patient Position: Sitting, BP Cuff Size: Large adult)   Pulse 78   Temp 97.8 °F (36.6 °C) (Temporal)   Ht 5' 2\" (1.575 m)   Wt 198 lb (89.8 kg)   LMP 01/24/2017 (Exact Date)   SpO2 97%   BMI 36.21 kg/m²     Physical Exam  Constitutional:       General: She is not in acute distress. Appearance: Normal appearance. Neurological:      Mental Status: She is alert and oriented to person, place, and time. ASSESSMENT and PLAN    ICD-10-CM ICD-9-CM    1. DEL ANGEL (nonalcoholic steatohepatitis)  K75.81 571.8       2. Positive SHANICE (antinuclear antibody)  R76.8 795.79       3. Severe obesity (BMI 35.0-39. 9) with comorbidity ([facility]  E66.01 278.01       4. Essential hypertension  I10 401.9 metoprolol succinate (TOPROL-XL) 100 mg tablet          Obesity significantly associated with DEL ANGEL  Discussed that I do not think she has lupus  Blood pressure controlled  Lose weight  Refills per orders    Follow-up and Dispositions    Return in about 6 weeks (around 3/22/2023) for blood pressure, check weight. Reviewed plan of care. Patient has provided input and agrees with goals.

## 2023-02-08 NOTE — PROGRESS NOTES
Identified pt with two pt identifiers. Reviewed record in preparation for visit and have obtained necessary documentation. All patient medications has been reviewed. Chief Complaint   Patient presents with    Results     Labs and US     Additional information about chief complaint:    Visit Vitals  Ht 5' 2\" (1.575 m)   Wt 198 lb (89.8 kg)   BMI 36.21 kg/m²       Health Maintenance Due   Topic    Shingles Vaccine (1 of 2)    COVID-19 Vaccine (3 - Booster for [employer] series)    Flu Vaccine (1)       1. Have you been to the ER, urgent care clinic since your last visit? Hospitalized since your last visit? no    2. Have you seen or consulted any other health care providers outside of the [address] [place] [address] since your last visit? Include any pap smears or colon screening.  no

## 2023-02-12 RX ORDER — TIOTROPIUM BROMIDE 18 UG/1
CAPSULE ORAL; RESPIRATORY (INHALATION)
Qty: 90 CAPSULE | Refills: 3 | Status: SHIPPED | OUTPATIENT
Start: 2023-02-12

## 2023-03-01 ENCOUNTER — OFFICE VISIT (OUTPATIENT)
Dept: FAMILY MEDICINE CLINIC | Age: 58
End: 2023-03-01
Payer: COMMERCIAL

## 2023-03-01 VITALS
WEIGHT: 198 LBS | DIASTOLIC BLOOD PRESSURE: 56 MMHG | HEART RATE: 55 BPM | SYSTOLIC BLOOD PRESSURE: 130 MMHG | TEMPERATURE: 97.5 F | HEIGHT: 62 IN | BODY MASS INDEX: 36.44 KG/M2 | OXYGEN SATURATION: 100 %

## 2023-03-01 DIAGNOSIS — Z23 ENCOUNTER FOR IMMUNIZATION: ICD-10-CM

## 2023-03-01 DIAGNOSIS — E66.9 OBESITY WITHOUT SERIOUS COMORBIDITY, UNSPECIFIED CLASSIFICATION, UNSPECIFIED OBESITY TYPE: ICD-10-CM

## 2023-03-01 DIAGNOSIS — I10 ESSENTIAL HYPERTENSION: Primary | ICD-10-CM

## 2023-03-01 PROCEDURE — 3078F DIAST BP <80 MM HG: CPT | Performed by: FAMILY MEDICINE

## 2023-03-01 PROCEDURE — 3075F SYST BP GE 130 - 139MM HG: CPT | Performed by: FAMILY MEDICINE

## 2023-03-01 PROCEDURE — 99214 OFFICE O/P EST MOD 30 MIN: CPT | Performed by: FAMILY MEDICINE

## 2023-03-01 RX ORDER — ZOSTER VACCINE RECOMBINANT, ADJUVANTED 50 MCG/0.5
KIT INTRAMUSCULAR
Qty: 0.5 ML | Refills: 1 | Status: SHIPPED | OUTPATIENT
Start: 2023-03-01

## 2023-03-01 RX ORDER — METOPROLOL SUCCINATE 100 MG/1
100 TABLET, EXTENDED RELEASE ORAL DAILY
Qty: 90 TABLET | Refills: 4 | Status: SHIPPED | OUTPATIENT
Start: 2023-03-01

## 2023-03-01 NOTE — PROGRESS NOTES
Identified pt with two pt identifiers. Reviewed record in preparation for visit and have obtained necessary documentation. All patient medications has been reviewed. Chief Complaint   Patient presents with    Follow-up     6 week    Hypertension    Weight Management     Additional information about chief complaint:    Visit Vitals  BP (!) 130/56 (BP 1 Location: Left upper arm, BP Patient Position: Sitting, BP Cuff Size: Large adult)   Pulse (!) 55   Temp 97.5 °F (36.4 °C) (Temporal)   Ht 5' 2\" (1.575 m)   Wt 198 lb (89.8 kg)   SpO2 100%   BMI 36.21 kg/m²       Health Maintenance Due   Topic    Shingles Vaccine (1 of 2)    COVID-19 Vaccine (4 - Booster for [employer] series)       1. Have you been to the ER, urgent care clinic since your last visit? Hospitalized since your last visit? no    2. Have you seen or consulted any other health care providers outside of the [address] [place] [address] since your last visit? Include any pap smears or colon screening.  no

## 2023-03-01 NOTE — PROGRESS NOTES
HISTORY OF PRESENT ILLNESS  Janki Torres is a 59 y.o. female. Patient presents with: Follow-up: 6 week  Hypertension  Weight Management    Her weight is unchanged despite daily walking and watching her diet. Review of Systems   Eyes:  Negative for blurred vision. Respiratory:  Negative for shortness of breath. Cardiovascular:  Negative for chest pain. Neurological:  Negative for dizziness, sensory change, speech change, focal weakness and headaches. Visit Vitals  BP (!) 130/56 (BP 1 Location: Left upper arm, BP Patient Position: Sitting, BP Cuff Size: Large adult)   Pulse (!) 55   Temp 97.5 °F (36.4 °C) (Temporal)   Ht 5' 2\" (1.575 m)   Wt 198 lb (89.8 kg)   LMP 01/24/2017 (Exact Date)   SpO2 100%   BMI 36.21 kg/m²     Physical Exam  Vitals and nursing note reviewed. Constitutional:       General: She is not in acute distress. Appearance: She is well-developed. She is not diaphoretic. Cardiovascular:      Rate and Rhythm: Normal rate and regular rhythm. Heart sounds: Normal heart sounds. No murmur heard. No friction rub. No gallop. Pulmonary:      Effort: Pulmonary effort is normal. No respiratory distress. Breath sounds: Normal breath sounds. No wheezing or rales. Skin:     General: Skin is warm and dry. Neurological:      Mental Status: She is alert and oriented to person, place, and time. ASSESSMENT and PLAN    ICD-10-CM ICD-9-CM    1. Essential hypertension  I10 401.9 metoprolol succinate (TOPROL-XL) 100 mg tablet      2. Obesity without serious comorbidity, unspecified classification, unspecified obesity type  E66.9 278.00       3. Encounter for immunization  Z23 V03.89 varicella-zoster recombinant, PF, (Shingrix, PF,) 50 mcg/0.5 mL susr injection          Blood pressure controlled  Unsuccessful with weight loss attempt  Continue current plans.   Refills per orders  Weight loss options discussed and she plans to try Weight [employer]  Shingrix at pharmacy    Follow-up and Dispositions    Return in about 6 months (around 9/1/2023) for blood pressure. Reviewed plan of care. Patient has provided input and agrees with goals.

## 2023-03-19 RX ORDER — MONTELUKAST SODIUM 10 MG/1
TABLET ORAL
Qty: 90 TABLET | Refills: 3 | Status: SHIPPED | OUTPATIENT
Start: 2023-03-19

## 2023-05-02 ENCOUNTER — VIRTUAL VISIT (OUTPATIENT)
Dept: FAMILY MEDICINE CLINIC | Age: 58
End: 2023-05-02
Payer: COMMERCIAL

## 2023-05-02 DIAGNOSIS — B37.89 CANDIDIASIS OF BREAST: Primary | ICD-10-CM

## 2023-05-02 PROCEDURE — 99213 OFFICE O/P EST LOW 20 MIN: CPT | Performed by: FAMILY MEDICINE

## 2023-05-02 RX ORDER — DOXYLAMINE SUCCINATE 25 MG
TABLET ORAL 2 TIMES DAILY
Qty: 30 G | Refills: 0 | Status: SHIPPED | OUTPATIENT
Start: 2023-05-02 | End: 2023-05-16

## 2023-06-27 LAB — COLONOSCOPY, EXTERNAL: NORMAL

## 2023-09-01 ENCOUNTER — OFFICE VISIT (OUTPATIENT)
Facility: CLINIC | Age: 58
End: 2023-09-01
Payer: COMMERCIAL

## 2023-09-01 VITALS
BODY MASS INDEX: 34.63 KG/M2 | DIASTOLIC BLOOD PRESSURE: 70 MMHG | HEIGHT: 62 IN | OXYGEN SATURATION: 99 % | HEART RATE: 50 BPM | RESPIRATION RATE: 16 BRPM | SYSTOLIC BLOOD PRESSURE: 117 MMHG | TEMPERATURE: 96.2 F | WEIGHT: 188.2 LBS

## 2023-09-01 DIAGNOSIS — I10 ESSENTIAL HYPERTENSION: Primary | ICD-10-CM

## 2023-09-01 PROCEDURE — 3078F DIAST BP <80 MM HG: CPT | Performed by: FAMILY MEDICINE

## 2023-09-01 PROCEDURE — 99213 OFFICE O/P EST LOW 20 MIN: CPT | Performed by: FAMILY MEDICINE

## 2023-09-01 PROCEDURE — 3074F SYST BP LT 130 MM HG: CPT | Performed by: FAMILY MEDICINE

## 2023-09-01 RX ORDER — METOPROLOL SUCCINATE 100 MG/1
100 TABLET, EXTENDED RELEASE ORAL DAILY
Qty: 90 TABLET | Refills: 4 | Status: SHIPPED | OUTPATIENT
Start: 2023-09-01

## 2023-09-01 RX ORDER — FLUTICASONE PROPIONATE AND SALMETEROL 250; 50 UG/1; UG/1
POWDER RESPIRATORY (INHALATION)
COMMUNITY
Start: 2023-08-01

## 2023-09-01 RX ORDER — MULTIVIT WITH MINERALS/LUTEIN
1000 TABLET ORAL DAILY
COMMUNITY

## 2023-09-01 SDOH — ECONOMIC STABILITY: HOUSING INSECURITY
IN THE LAST 12 MONTHS, WAS THERE A TIME WHEN YOU DID NOT HAVE A STEADY PLACE TO SLEEP OR SLEPT IN A SHELTER (INCLUDING NOW)?: PATIENT REFUSED

## 2023-09-01 SDOH — ECONOMIC STABILITY: INCOME INSECURITY: HOW HARD IS IT FOR YOU TO PAY FOR THE VERY BASICS LIKE FOOD, HOUSING, MEDICAL CARE, AND HEATING?: PATIENT DECLINED

## 2023-09-01 SDOH — ECONOMIC STABILITY: TRANSPORTATION INSECURITY
IN THE PAST 12 MONTHS, HAS LACK OF TRANSPORTATION KEPT YOU FROM MEETINGS, WORK, OR FROM GETTING THINGS NEEDED FOR DAILY LIVING?: PATIENT DECLINED

## 2023-09-01 SDOH — ECONOMIC STABILITY: FOOD INSECURITY: WITHIN THE PAST 12 MONTHS, THE FOOD YOU BOUGHT JUST DIDN'T LAST AND YOU DIDN'T HAVE MONEY TO GET MORE.: PATIENT DECLINED

## 2023-09-01 SDOH — ECONOMIC STABILITY: FOOD INSECURITY: WITHIN THE PAST 12 MONTHS, YOU WORRIED THAT YOUR FOOD WOULD RUN OUT BEFORE YOU GOT MONEY TO BUY MORE.: PATIENT DECLINED

## 2023-09-01 ASSESSMENT — ENCOUNTER SYMPTOMS: SHORTNESS OF BREATH: 0

## 2023-09-02 DIAGNOSIS — I10 ESSENTIAL HYPERTENSION: ICD-10-CM

## 2023-11-09 RX ORDER — OMEPRAZOLE 20 MG/1
CAPSULE, DELAYED RELEASE ORAL
Qty: 90 CAPSULE | Refills: 3 | Status: SHIPPED | OUTPATIENT
Start: 2023-11-09

## 2023-11-09 NOTE — TELEPHONE ENCOUNTER
Pt last seen by Dr. Shana Bojorquez 9/1/23 called regarding 90 day refill requested for Omeprazole stating Dr. Shana Bojorquez has not filled it and she just found out about her leaving the practice. Can nurse please pend medication to provider to go to [facility] in [place]?  Landon

## 2024-03-04 RX ORDER — MONTELUKAST SODIUM 10 MG/1
TABLET ORAL
Qty: 90 TABLET | OUTPATIENT
Start: 2024-03-04

## 2024-03-20 RX ORDER — MONTELUKAST SODIUM 10 MG/1
10 TABLET ORAL DAILY
Qty: 90 TABLET | Refills: 1 | Status: SHIPPED | OUTPATIENT
Start: 2024-03-20

## 2024-03-20 NOTE — TELEPHONE ENCOUNTER
Another Nancy pt that was told refills would be done until new pt appt with Ismael in July    Pt is requesting a refill on     SINGULAIR 10 MG

## 2024-07-08 SDOH — HEALTH STABILITY: PHYSICAL HEALTH: ON AVERAGE, HOW MANY DAYS PER WEEK DO YOU ENGAGE IN MODERATE TO STRENUOUS EXERCISE (LIKE A BRISK WALK)?: 1 DAY

## 2024-07-08 SDOH — HEALTH STABILITY: PHYSICAL HEALTH: ON AVERAGE, HOW MANY MINUTES DO YOU ENGAGE IN EXERCISE AT THIS LEVEL?: 20 MIN

## 2024-07-09 ENCOUNTER — OFFICE VISIT (OUTPATIENT)
Facility: CLINIC | Age: 59
End: 2024-07-09
Payer: COMMERCIAL

## 2024-07-09 VITALS
BODY MASS INDEX: 36.95 KG/M2 | WEIGHT: 202 LBS | TEMPERATURE: 97 F | DIASTOLIC BLOOD PRESSURE: 82 MMHG | SYSTOLIC BLOOD PRESSURE: 133 MMHG | OXYGEN SATURATION: 97 % | HEART RATE: 55 BPM

## 2024-07-09 DIAGNOSIS — K75.81 NASH (NONALCOHOLIC STEATOHEPATITIS): ICD-10-CM

## 2024-07-09 DIAGNOSIS — D86.0 SARCOIDOSIS OF LUNG (HCC): ICD-10-CM

## 2024-07-09 DIAGNOSIS — E78.00 HYPERCHOLESTEROLEMIA: ICD-10-CM

## 2024-07-09 DIAGNOSIS — R76.8 ANTI-RNP ANTIBODIES PRESENT: ICD-10-CM

## 2024-07-09 DIAGNOSIS — E66.01 CLASS 2 SEVERE OBESITY DUE TO EXCESS CALORIES WITH SERIOUS COMORBIDITY AND BODY MASS INDEX (BMI) OF 35.0 TO 35.9 IN ADULT (HCC): ICD-10-CM

## 2024-07-09 DIAGNOSIS — Z13.1 DIABETES MELLITUS SCREENING: ICD-10-CM

## 2024-07-09 DIAGNOSIS — Z78.0 POSTMENOPAUSAL: ICD-10-CM

## 2024-07-09 DIAGNOSIS — I10 ESSENTIAL HYPERTENSION: Primary | ICD-10-CM

## 2024-07-09 DIAGNOSIS — Z82.62 FAMILY HX OSTEOPOROSIS: ICD-10-CM

## 2024-07-09 PROCEDURE — 3075F SYST BP GE 130 - 139MM HG: CPT | Performed by: FAMILY MEDICINE

## 2024-07-09 PROCEDURE — 99214 OFFICE O/P EST MOD 30 MIN: CPT | Performed by: FAMILY MEDICINE

## 2024-07-09 PROCEDURE — 3079F DIAST BP 80-89 MM HG: CPT | Performed by: FAMILY MEDICINE

## 2024-07-09 ASSESSMENT — PATIENT HEALTH QUESTIONNAIRE - PHQ9
SUM OF ALL RESPONSES TO PHQ QUESTIONS 1-9: 0
1. LITTLE INTEREST OR PLEASURE IN DOING THINGS: NOT AT ALL
SUM OF ALL RESPONSES TO PHQ9 QUESTIONS 1 & 2: 0
SUM OF ALL RESPONSES TO PHQ QUESTIONS 1-9: 0
2. FEELING DOWN, DEPRESSED OR HOPELESS: NOT AT ALL
SUM OF ALL RESPONSES TO PHQ QUESTIONS 1-9: 0
SUM OF ALL RESPONSES TO PHQ QUESTIONS 1-9: 0

## 2024-07-09 NOTE — PATIENT INSTRUCTIONS
elliptical  - **Strength Training:** Light weights (bicep curls, tricep dips, shoulder press) for 20 minutes    **Thursday**  - **Cardio:** 30 minutes of dancing or aerobics class  - **Flexibility:** 20 minutes of Pilates or stretching    **Friday**  - **Cardio:** 30 minutes of cycling or fast-paced walking  - **Strength Training:** Bodyweight exercises for 20 minutes    **Saturday**  - **Cardio:** 30 minutes of hiking or a long walk  - **Flexibility:** 20 minutes of yoga or stretching    **Sunday**  - **Rest Day:** Light stretching or a leisurely walk    ### Tips for Staying on Track    1. **Preparation:** Spend time on Sunday to meal prep for the week. Pre-cut vegetables, portion out snacks, and plan your meals.  2. **Tracking:** Use a simple simin or a journal to log meals and exercise. Set reminders on your phone for meal and workout times.  3. **Hydration:** Drink plenty of water throughout the day to stay hydrated.  4. **Commute Exercise:** Consider using part of your commute for exercise if possible, such as parking further away or taking public transport part of the way and walking the rest.  5. **Support:** Find a workout naveed or join a class to stay motivated and accountable.    ### Important Notes    - **Consult Your Doctor:** Before starting any new diet or exercise regimen, it's crucial to consult with your healthcare provider to ensure it's safe and appropriate for your specific health conditions.  - **Progress Gradually:** Start with lighter activities and gradually increase intensity as you build strength and endurance.    This plan aims to balance nutrition and physical activity while accommodating your busy schedule.

## 2024-07-09 NOTE — PROGRESS NOTES
SUBJECTIVES  with respective ASSESSMENT/PLAN:  Ms. Kristyn Contreras is a 59 y.o. female established patient who presents for Established New Doctor (DX-HTN)  , found to have the followin. Essential hypertension  Overview:  BP Readings from Last 3 Encounters:   24 133/82   23 117/70   23 (!) 120/56     Medication: metoprolol succinate 100mg daily  Prev Meds: none can recall.  Assessment & Plan:   Borderline controlled, continue current medications and lifestyle modifications recommended  Orders:  -     Comprehensive Metabolic Panel; Future  -     CBC; Future  -     Thyroid Cascade Profile; Future  2. Sarcoidosis of lung (HCC)  Overview:  Specialist: Dr. Zheng of pulmonology    Interval Hx:  - cleo, graduated to annual visits and scans.  Assessment & Plan:   Monitored by specialist- no acute findings meriting change in the plan  3. HUNTER (nonalcoholic steatohepatitis)  Overview:  Lab Results   Component Value Date     (H) 2023    K 4.5 2023     2023    CO2 26 2023    BUN 11 2023    CREATININE 1.02 (H) 2023    GLUCOSE 90 2023    CALCIUM 9.6 2023    BILITOT 0.4 2023    ALKPHOS 143 (H) 2023    AST 28 2023    ALT 41 (H) 2023    GFRAA 93 2022    AGRATIO 1.2 2019     Background: Confirmed on liver biopsy.  Assessment & Plan:   Asymptomatic, continue current medications, continue current treatment plan, and lifestyle modifications recommended  Orders:  -     Hemoglobin A1C; Future  4. Hypercholesterolemia  Overview:  Lab Results   Component Value Date    CHOL 193 2023    CHOL 174 2020    CHOL 178 2019     Lab Results   Component Value Date    TRIG 138 2023    TRIG 114 2020    TRIG 103 2019     Lab Results   Component Value Date    HDL 46 2023    HDL 46 2020    HDL 48 2019     No results found for: \"LDL\"  Lab Results   Component Value Date    VLDL 25

## 2024-07-09 NOTE — ASSESSMENT & PLAN NOTE
Asymptomatic, continue current medications, continue current treatment plan, and lifestyle modifications recommended

## 2024-07-22 ENCOUNTER — HOSPITAL ENCOUNTER (OUTPATIENT)
Facility: HOSPITAL | Age: 59
Discharge: HOME | End: 2024-07-25
Attending: FAMILY MEDICINE
Payer: COMMERCIAL

## 2024-07-22 DIAGNOSIS — Z12.31 SCREENING MAMMOGRAM FOR BREAST CANCER: ICD-10-CM

## 2024-07-22 DIAGNOSIS — Z78.0 POSTMENOPAUSAL: ICD-10-CM

## 2024-07-22 PROCEDURE — 77063 BREAST TOMOSYNTHESIS BI: CPT

## 2024-07-22 PROCEDURE — 77080 DXA BONE DENSITY AXIAL: CPT

## 2024-09-11 RX ORDER — MONTELUKAST SODIUM 10 MG/1
10 TABLET ORAL DAILY
Qty: 90 TABLET | Refills: 1 | Status: SHIPPED | OUTPATIENT
Start: 2024-09-11

## 2024-09-16 DIAGNOSIS — I10 ESSENTIAL HYPERTENSION: ICD-10-CM

## 2024-09-17 RX ORDER — METOPROLOL SUCCINATE 100 MG/1
100 TABLET, EXTENDED RELEASE ORAL DAILY
Qty: 90 TABLET | Refills: 4 | Status: SHIPPED | OUTPATIENT
Start: 2024-09-17

## 2024-10-09 DIAGNOSIS — Z13.1 DIABETES MELLITUS SCREENING: ICD-10-CM

## 2024-10-09 DIAGNOSIS — E66.01 CLASS 2 SEVERE OBESITY DUE TO EXCESS CALORIES WITH SERIOUS COMORBIDITY AND BODY MASS INDEX (BMI) OF 35.0 TO 35.9 IN ADULT: ICD-10-CM

## 2024-10-09 DIAGNOSIS — I10 ESSENTIAL HYPERTENSION: ICD-10-CM

## 2024-10-09 DIAGNOSIS — K75.81 NASH (NONALCOHOLIC STEATOHEPATITIS): ICD-10-CM

## 2024-10-09 DIAGNOSIS — E78.00 HYPERCHOLESTEROLEMIA: ICD-10-CM

## 2024-10-09 DIAGNOSIS — E66.812 CLASS 2 SEVERE OBESITY DUE TO EXCESS CALORIES WITH SERIOUS COMORBIDITY AND BODY MASS INDEX (BMI) OF 35.0 TO 35.9 IN ADULT: ICD-10-CM

## 2024-11-19 ENCOUNTER — TELEPHONE (OUTPATIENT)
Facility: CLINIC | Age: 59
End: 2024-11-19

## 2024-11-19 NOTE — TELEPHONE ENCOUNTER
Lab orders placed 10/02/2024.    Please call pt, and ask that female have labs drawn prior to scheduled appt, so results can be discussed at that time.

## 2024-11-23 LAB
ALBUMIN SERPL-MCNC: 4.1 G/DL (ref 3.9–4.9)
ALP SERPL-CCNC: 123 IU/L (ref 44–121)
ALT SERPL-CCNC: 32 IU/L (ref 0–32)
AST SERPL-CCNC: 28 IU/L (ref 0–40)
BILIRUB SERPL-MCNC: 0.5 MG/DL (ref 0–1.2)
BUN SERPL-MCNC: 13 MG/DL (ref 8–27)
BUN/CREAT SERPL: 14 (ref 12–28)
CALCIUM SERPL-MCNC: 9.6 MG/DL (ref 8.7–10.3)
CHLORIDE SERPL-SCNC: 101 MMOL/L (ref 96–106)
CHOLEST SERPL-MCNC: 198 MG/DL (ref 100–199)
CO2 SERPL-SCNC: 25 MMOL/L (ref 20–29)
CREAT SERPL-MCNC: 0.94 MG/DL (ref 0.57–1)
EGFRCR SERPLBLD CKD-EPI 2021: 69 ML/MIN/1.73
ERYTHROCYTE [DISTWIDTH] IN BLOOD BY AUTOMATED COUNT: 12.5 % (ref 11.7–15.4)
GLOBULIN SER CALC-MCNC: 2.9 G/DL (ref 1.5–4.5)
GLUCOSE SERPL-MCNC: 88 MG/DL (ref 70–99)
HBA1C MFR BLD: 5.7 % (ref 4.8–5.6)
HCT VFR BLD AUTO: 46.8 % (ref 34–46.6)
HDLC SERPL-MCNC: 51 MG/DL
HGB BLD-MCNC: 15.1 G/DL (ref 11.1–15.9)
IMP & REVIEW OF LAB RESULTS: NORMAL
LDLC SERPL CALC-MCNC: 129 MG/DL (ref 0–99)
MCH RBC QN AUTO: 30.4 PG (ref 26.6–33)
MCHC RBC AUTO-ENTMCNC: 32.3 G/DL (ref 31.5–35.7)
MCV RBC AUTO: 94 FL (ref 79–97)
PLATELET # BLD AUTO: 239 X10E3/UL (ref 150–450)
POTASSIUM SERPL-SCNC: 4.2 MMOL/L (ref 3.5–5.2)
PROT SERPL-MCNC: 7 G/DL (ref 6–8.5)
RBC # BLD AUTO: 4.97 X10E6/UL (ref 3.77–5.28)
SODIUM SERPL-SCNC: 140 MMOL/L (ref 134–144)
TRIGL SERPL-MCNC: 98 MG/DL (ref 0–149)
TSH SERPL DL<=0.005 MIU/L-ACNC: 2.33 UIU/ML (ref 0.45–4.5)
VLDLC SERPL CALC-MCNC: 18 MG/DL (ref 5–40)
WBC # BLD AUTO: 6 X10E3/UL (ref 3.4–10.8)

## 2024-12-05 ENCOUNTER — OFFICE VISIT (OUTPATIENT)
Facility: CLINIC | Age: 59
End: 2024-12-05
Payer: COMMERCIAL

## 2024-12-05 VITALS
BODY MASS INDEX: 36.95 KG/M2 | HEART RATE: 55 BPM | OXYGEN SATURATION: 100 % | HEIGHT: 62 IN | DIASTOLIC BLOOD PRESSURE: 82 MMHG | SYSTOLIC BLOOD PRESSURE: 138 MMHG | TEMPERATURE: 97.6 F

## 2024-12-05 DIAGNOSIS — E55.9 VITAMIN D DEFICIENCY: ICD-10-CM

## 2024-12-05 DIAGNOSIS — R73.03 PREDIABETES: ICD-10-CM

## 2024-12-05 DIAGNOSIS — E66.01 CLASS 2 SEVERE OBESITY DUE TO EXCESS CALORIES WITH SERIOUS COMORBIDITY AND BODY MASS INDEX (BMI) OF 36.0 TO 36.9 IN ADULT: ICD-10-CM

## 2024-12-05 DIAGNOSIS — Z12.4 CERVICAL CANCER SCREENING: ICD-10-CM

## 2024-12-05 DIAGNOSIS — E66.812 CLASS 2 SEVERE OBESITY DUE TO EXCESS CALORIES WITH SERIOUS COMORBIDITY AND BODY MASS INDEX (BMI) OF 36.0 TO 36.9 IN ADULT: ICD-10-CM

## 2024-12-05 DIAGNOSIS — E78.00 HYPERCHOLESTEROLEMIA: Primary | ICD-10-CM

## 2024-12-05 DIAGNOSIS — I10 ESSENTIAL HYPERTENSION: ICD-10-CM

## 2024-12-05 DIAGNOSIS — K90.0 CELIAC DISEASE: ICD-10-CM

## 2024-12-05 PROBLEM — S82.832D CLOSED FRACTURE OF DISTAL END OF LEFT FIBULA WITH ROUTINE HEALING: Status: RESOLVED | Noted: 2020-02-11 | Resolved: 2024-12-05

## 2024-12-05 PROBLEM — S93.492A SPRAIN OF ANTERIOR TALOFIBULAR LIGAMENT OF LEFT ANKLE: Status: RESOLVED | Noted: 2020-02-11 | Resolved: 2024-12-05

## 2024-12-05 PROCEDURE — 99214 OFFICE O/P EST MOD 30 MIN: CPT | Performed by: FAMILY MEDICINE

## 2024-12-05 PROCEDURE — 3075F SYST BP GE 130 - 139MM HG: CPT | Performed by: FAMILY MEDICINE

## 2024-12-05 PROCEDURE — 3079F DIAST BP 80-89 MM HG: CPT | Performed by: FAMILY MEDICINE

## 2024-12-05 RX ORDER — FLUTICASONE PROPIONATE AND SALMETEROL 100; 50 UG/1; UG/1
POWDER RESPIRATORY (INHALATION)
COMMUNITY
Start: 2024-12-03

## 2024-12-05 SDOH — ECONOMIC STABILITY: FOOD INSECURITY: WITHIN THE PAST 12 MONTHS, YOU WORRIED THAT YOUR FOOD WOULD RUN OUT BEFORE YOU GOT MONEY TO BUY MORE.: NEVER TRUE

## 2024-12-05 SDOH — ECONOMIC STABILITY: FOOD INSECURITY: WITHIN THE PAST 12 MONTHS, THE FOOD YOU BOUGHT JUST DIDN'T LAST AND YOU DIDN'T HAVE MONEY TO GET MORE.: NEVER TRUE

## 2024-12-05 SDOH — ECONOMIC STABILITY: INCOME INSECURITY: HOW HARD IS IT FOR YOU TO PAY FOR THE VERY BASICS LIKE FOOD, HOUSING, MEDICAL CARE, AND HEATING?: NOT VERY HARD

## 2024-12-05 ASSESSMENT — PATIENT HEALTH QUESTIONNAIRE - PHQ9
SUM OF ALL RESPONSES TO PHQ QUESTIONS 1-9: 0
SUM OF ALL RESPONSES TO PHQ QUESTIONS 1-9: 0
SUM OF ALL RESPONSES TO PHQ9 QUESTIONS 1 & 2: 0
SUM OF ALL RESPONSES TO PHQ QUESTIONS 1-9: 0
1. LITTLE INTEREST OR PLEASURE IN DOING THINGS: NOT AT ALL
2. FEELING DOWN, DEPRESSED OR HOPELESS: NOT AT ALL
SUM OF ALL RESPONSES TO PHQ QUESTIONS 1-9: 0

## 2024-12-05 NOTE — PROGRESS NOTES
Assessment & Plan  1. Class II Obesity.  She has been struggling with weight management and has not found the Mediterranean diet sustainable. She was advised to continue trying the Mediterranean diet and to increase her fiber intake. A referral to a dietitian, Nancy Wan, was made for personalized dietary planning and weight management.    2. Essential Hypertension.  Her blood pressure was elevated at 152/82 during this visit. She is currently on metoprolol succinate 100 mg daily. A plan to switch her to a low dose of olmesartan and reduce her metoprolol dosage by half was discussed but will be delayed until after her upcoming trip. She was advised to monitor her blood pressure and continue her current medication until the next visit.    3. Hypercholesterolemia.  She is not currently on any medication for her high cholesterol. She was advised to continue dietary modifications and work with the dietitian to manage her cholesterol levels.    4. Gastroesophageal Reflux Disease (GERD).  She was advised to continue managing her GERD with dietary modifications and to avoid foods that trigger her symptoms.    5. Celiac Disease.  She is doing her best to stay gluten-free but struggles with cross-contamination when eating out. She was advised to continue her efforts to avoid gluten and to seek out restaurants experienced with gluten-free food preparation.    6. Prediabetes.  Her A1c levels indicate prediabetes. She is not currently on any medication for this condition. She was advised to continue dietary modifications and work with the dietitian to manage her blood sugar levels.    7. Health Maintenance.  She is overdue for a Pap smear. A referral to Isis Matias, OB/GYN, was made for cervical cancer screening. She was instructed to complete her labs at [facility] before the next visit.    Follow-up  Return in 6 months for follow-up.    It was a pleasure seeing Ms. Kristyn Contreras today.  No follow-ups on file.    History of

## 2024-12-05 NOTE — PROGRESS NOTES
Chief Complaint   Patient presents with    Follow-up     Chronic disease management.     Discuss Labs     \"Have you been to the ER, urgent care clinic since your last visit?  Hospitalized since your last visit?\"    NO    “Have you seen or consulted any other health care providers outside of [facility] since your last visit?”    NO     “Have you had a pap smear?”    NO    Date of last Cervical Cancer screen (HPV or PAP): 11/1/2019             Click Here for Release of Records Request

## 2025-02-04 ENCOUNTER — HOSPITAL ENCOUNTER (OUTPATIENT)
Facility: HOSPITAL | Age: 60
Setting detail: RECURRING SERIES
Discharge: HOME | End: 2025-02-07
Payer: COMMERCIAL

## 2025-02-04 PROCEDURE — 97802 MEDICAL NUTRITION INDIV IN: CPT

## 2025-02-04 NOTE — PROGRESS NOTES
Andrei Ahumada - Outpatient Nutrition Services at [facility]  [address], [address]   [place] [address]    Nutrition Assessment - Medical Nutrition Therapy   Outpatient Initial Evaluation         Patient Name: Kristyn Contreras : 1965   Treatment Diagnosis: E78.00 (ICD-10-CM) - Hypercholesterolemia   E66.812, E66.01, Z68.36 (ICD-10-CM) - Class 2 severe obesity due to excess calories with serious comorbidity and body mass index (BMI) of 36.0 to 36.9 in adult   K90.0 (ICD-10-CM) - Celiac disease   I10 (ICD-10-CM) - Essential hypertension   R73.03 (ICD-10-CM) - Prediabetes      Referral Source: Rajiv Guevara MD Start of Care (SOC): 2025     In time:   100pm             Out time:   200pm   Total Treatment Time (min):   60     Gender: female Age: 60 y.o.   Ht: 62 in Wt: 202 lb 91.6 kg   BMI: 36.9 (class 2 obesity) BMR   Male  BMR Female 1430     Past Medical History:  Patient Active Problem List   Diagnosis    Class 2 severe obesity due to excess calories with serious comorbidity and body mass index (BMI) of 36.0 to 36.9 in adult    Celiac disease    GERD (gastroesophageal reflux disease)    Pulmonary sarcoidosis (HCC)    Palpitations    Hypercholesterolemia    HUNTER (nonalcoholic steatohepatitis)    Essential hypertension    Anti-RNP antibodies present    Prediabetes        Pertinent Medications:     Current Outpatient Medications:     fluticasone-salmeterol (ADVAIR) 100-50 MCG/ACT AEPB diskus inhaler, inhale 1 puff by mouth and INTO THE LUNGS twice a day Rinse mouth after use, Disp: , Rfl:     omeprazole (PRILOSEC) 20 MG delayed release capsule, take 1 capsule by mouth once daily (TO REPLACE RANITIDINE), Disp: 90 capsule, Rfl: 3    metoprolol succinate (TOPROL XL) 100 MG extended release tablet, Take 1 tablet by mouth daily, Disp: 90 tablet, Rfl: 4    montelukast (SINGULAIR) 10 MG tablet, take 1 tablet by mouth once daily, Disp: 90 tablet, Rfl: 1    fluticasone-salmeterol

## 2025-03-03 NOTE — TELEPHONE ENCOUNTER
[facility] # [id number] faxed refill request:     Montelukast Sod 10 mg tablet   Quantity: 90  Ldm

## 2025-03-04 RX ORDER — MONTELUKAST SODIUM 10 MG/1
10 TABLET ORAL DAILY
Qty: 90 TABLET | Refills: 1 | Status: SHIPPED | OUTPATIENT
Start: 2025-03-04

## 2025-05-05 DIAGNOSIS — E78.00 HYPERCHOLESTEROLEMIA: ICD-10-CM

## 2025-05-05 DIAGNOSIS — I10 ESSENTIAL HYPERTENSION: ICD-10-CM

## 2025-05-05 DIAGNOSIS — R73.03 PREDIABETES: ICD-10-CM

## 2025-05-05 DIAGNOSIS — E55.9 VITAMIN D DEFICIENCY: ICD-10-CM

## 2025-05-30 LAB
25(OH)D3+25(OH)D2 SERPL-MCNC: 49.5 NG/ML (ref 30–100)
ALBUMIN SERPL-MCNC: 4.1 G/DL (ref 3.9–4.9)
ALP SERPL-CCNC: 123 IU/L (ref 44–121)
ALT SERPL-CCNC: 40 IU/L (ref 0–32)
AST SERPL-CCNC: 29 IU/L (ref 0–40)
BILIRUB SERPL-MCNC: 0.4 MG/DL (ref 0–1.2)
BUN SERPL-MCNC: 15 MG/DL (ref 8–27)
BUN/CREAT SERPL: 16 (ref 12–28)
CALCIUM SERPL-MCNC: 9.9 MG/DL (ref 8.7–10.3)
CHLORIDE SERPL-SCNC: 103 MMOL/L (ref 96–106)
CHOLEST SERPL-MCNC: 185 MG/DL (ref 100–199)
CO2 SERPL-SCNC: 25 MMOL/L (ref 20–29)
CREAT SERPL-MCNC: 0.92 MG/DL (ref 0.57–1)
EGFRCR SERPLBLD CKD-EPI 2021: 70 ML/MIN/1.73
ERYTHROCYTE [DISTWIDTH] IN BLOOD BY AUTOMATED COUNT: 12.8 % (ref 11.7–15.4)
GLOBULIN SER CALC-MCNC: 3 G/DL (ref 1.5–4.5)
GLUCOSE SERPL-MCNC: 86 MG/DL (ref 70–99)
HBA1C MFR BLD: 5.5 % (ref 4.8–5.6)
HCT VFR BLD AUTO: 47.5 % (ref 34–46.6)
HDL SERPL-SCNC: 28.8 UMOL/L
HDLC SERPL-MCNC: 42 MG/DL
HGB BLD-MCNC: 14.9 G/DL (ref 11.1–15.9)
LDL SERPL QN: 21.3 NM
LDL SERPL-SCNC: 1524 NMOL/L
LDL SMALL SERPL-SCNC: 669 NMOL/L
LDLC SERPL CALC-MCNC: 119 MG/DL (ref 0–99)
LP-IR SCORE SERPL: 55
LPA SERPL-SCNC: <8.4 NMOL/L
MCH RBC QN AUTO: 29.8 PG (ref 26.6–33)
MCHC RBC AUTO-ENTMCNC: 31.4 G/DL (ref 31.5–35.7)
MCV RBC AUTO: 95 FL (ref 79–97)
PLATELET # BLD AUTO: 239 X10E3/UL (ref 150–450)
POTASSIUM SERPL-SCNC: 4.7 MMOL/L (ref 3.5–5.2)
PROT SERPL-MCNC: 7.1 G/DL (ref 6–8.5)
RBC # BLD AUTO: 5 X10E6/UL (ref 3.77–5.28)
SODIUM SERPL-SCNC: 143 MMOL/L (ref 134–144)
TRIGL SERPL-MCNC: 136 MG/DL (ref 0–149)
TSH SERPL DL<=0.005 MIU/L-ACNC: 2.68 UIU/ML (ref 0.45–4.5)
WBC # BLD AUTO: 7.4 X10E3/UL (ref 3.4–10.8)

## 2025-05-31 LAB — IMP & REVIEW OF LAB RESULTS: NORMAL

## 2025-06-05 ENCOUNTER — OFFICE VISIT (OUTPATIENT)
Facility: CLINIC | Age: 60
End: 2025-06-05
Payer: COMMERCIAL

## 2025-06-05 VITALS
BODY MASS INDEX: 36.8 KG/M2 | TEMPERATURE: 97.4 F | DIASTOLIC BLOOD PRESSURE: 79 MMHG | RESPIRATION RATE: 16 BRPM | HEIGHT: 62 IN | SYSTOLIC BLOOD PRESSURE: 139 MMHG | OXYGEN SATURATION: 98 % | HEART RATE: 54 BPM | WEIGHT: 200 LBS

## 2025-06-05 DIAGNOSIS — E66.812 CLASS 2 SEVERE OBESITY DUE TO EXCESS CALORIES WITH SERIOUS COMORBIDITY AND BODY MASS INDEX (BMI) OF 36.0 TO 36.9 IN ADULT (HCC): Primary | ICD-10-CM

## 2025-06-05 DIAGNOSIS — K75.81 NASH (NONALCOHOLIC STEATOHEPATITIS): ICD-10-CM

## 2025-06-05 DIAGNOSIS — R06.83 SNORING: ICD-10-CM

## 2025-06-05 DIAGNOSIS — D86.0 PULMONARY SARCOIDOSIS: ICD-10-CM

## 2025-06-05 DIAGNOSIS — R73.03 PREDIABETES: ICD-10-CM

## 2025-06-05 DIAGNOSIS — I10 ESSENTIAL HYPERTENSION: ICD-10-CM

## 2025-06-05 DIAGNOSIS — E66.01 CLASS 2 SEVERE OBESITY DUE TO EXCESS CALORIES WITH SERIOUS COMORBIDITY AND BODY MASS INDEX (BMI) OF 36.0 TO 36.9 IN ADULT (HCC): Primary | ICD-10-CM

## 2025-06-05 DIAGNOSIS — E78.00 HYPERCHOLESTEROLEMIA: ICD-10-CM

## 2025-06-05 PROCEDURE — 99214 OFFICE O/P EST MOD 30 MIN: CPT | Performed by: FAMILY MEDICINE

## 2025-06-05 PROCEDURE — 3075F SYST BP GE 130 - 139MM HG: CPT | Performed by: FAMILY MEDICINE

## 2025-06-05 PROCEDURE — 3078F DIAST BP <80 MM HG: CPT | Performed by: FAMILY MEDICINE

## 2025-06-05 RX ORDER — MAGNESIUM GLYCINATE 100 MG
400 CAPSULE ORAL NIGHTLY
COMMUNITY
Start: 2025-06-05

## 2025-06-05 SDOH — ECONOMIC STABILITY: TRANSPORTATION INSECURITY
IN THE PAST 12 MONTHS, HAS THE LACK OF TRANSPORTATION KEPT YOU FROM MEDICAL APPOINTMENTS OR FROM GETTING MEDICATIONS?: NO

## 2025-06-05 SDOH — ECONOMIC STABILITY: FOOD INSECURITY: WITHIN THE PAST 12 MONTHS, THE FOOD YOU BOUGHT JUST DIDN'T LAST AND YOU DIDN'T HAVE MONEY TO GET MORE.: NEVER TRUE

## 2025-06-05 SDOH — ECONOMIC STABILITY: FOOD INSECURITY: WITHIN THE PAST 12 MONTHS, YOU WORRIED THAT YOUR FOOD WOULD RUN OUT BEFORE YOU GOT MONEY TO BUY MORE.: NEVER TRUE

## 2025-06-05 SDOH — ECONOMIC STABILITY: INCOME INSECURITY: IN THE LAST 12 MONTHS, WAS THERE A TIME WHEN YOU WERE NOT ABLE TO PAY THE MORTGAGE OR RENT ON TIME?: NO

## 2025-06-05 SDOH — ECONOMIC STABILITY: TRANSPORTATION INSECURITY
IN THE PAST 12 MONTHS, HAS LACK OF TRANSPORTATION KEPT YOU FROM MEETINGS, WORK, OR FROM GETTING THINGS NEEDED FOR DAILY LIVING?: NO

## 2025-06-05 ASSESSMENT — PATIENT HEALTH QUESTIONNAIRE - PHQ9
SUM OF ALL RESPONSES TO PHQ QUESTIONS 1-9: 0
SUM OF ALL RESPONSES TO PHQ QUESTIONS 1-9: 0
1. LITTLE INTEREST OR PLEASURE IN DOING THINGS: NOT AT ALL
2. FEELING DOWN, DEPRESSED OR HOPELESS: NOT AT ALL
SUM OF ALL RESPONSES TO PHQ QUESTIONS 1-9: 0
SUM OF ALL RESPONSES TO PHQ QUESTIONS 1-9: 0

## 2025-06-05 NOTE — PATIENT INSTRUCTIONS
Citrus bergamot 500-1000mg daily.    Magnesium Glycinate 400mg nightly.    Keywords and Lifestyle Strategies to consider and look up:    ACTIVITY:  General Movement is important to burn calories, keep your metabolism and mitochondria moving and functioning. Our bodies are built to be moving no less than 10,000 steps per day, and really we should strive for 20,000 steps per day. At the end of the day we should be able to say that we have been moving more than sitting or standing still.    SOLEUS PUSH-UPS: there is a group of studies showing that simple heel raises while sitting over hours can burn considerable calories and keep your base metabolic rate elevated.  How to perform a soleus push-up:   1. Seated Position:  Sit on a chair or bench with your feet flat on the floor, knees bent at 90 degrees.   2. Foot Position:  Ensure your feet are flat on the floor, with toes pointed forward or slightly outwards, depending on the desired variation.   3. Raise Heels:  Push down on the balls of your feet, lifting your heels off the floor while keeping your toes in contact.   4. Hold and Lower:  Hold the position for a second or two, then slowly lower your heels back to the starting position.   5. Repeat:  Repeat the movement for the desired number of repetitions.     Cardio keeps the heart strong, 30 minutes of moderate activity 5 days per week is the goal. Consider the \"Couch to 10K\" phone simin.    Strength and muscle building burns fats even on rest/recover days, and can even help with healthy physiologic testosterone production (men and women) to make it easier to regulate your metabolism.    High Intensity Interval Training or H-I-I-T; a method of exercising more intensely with a cominbination of muscle/strength techniques and cardio for 15-20 minutes 3 days per week.    Calories:  These are important once you are doing the above things. The less we move, the less calories we need to fuel our day. But how to know how much

## 2025-06-05 NOTE — PROGRESS NOTES
Chief Complaint   Patient presents with    Follow-up     Gen Boardvote is good no new concerns for pcp today.     Hypertension     Doesn't check bp at home. Med compliant.     Discuss Labs     Had labs done and would like to discuss.

## 2025-06-05 NOTE — PROGRESS NOTES
Assessment & Plan  1. Nonalcoholic steatohepatitis (HUNTER): Chronic.  - Recommended magnesium glycinate at night for metabolic management and liver health.    2. Celiac disease: Stable.  - Advised limiting rice to once a week and avoiding rice-based snacks. Continue avoiding wheat and bread.    3. Prediabetes: Resolved.  - Blood glucose normalized. Continue dietary modifications, reduce sugar intake, increase water consumption, and monitor glucose levels.    4. Pulmonary sarcoidosis: Stable.  - Condition stable without regular medication. Discuss sleep apnea evaluation with Dr. Restrepo in October 2025.    5. Hypertension: Borderline.  - Blood pressure borderline on metoprolol succinate 100 mg. Attempt weight loss before considering alternative medications. Discuss sleep apnea management.    6. Hyperlipidemia: Slight improvement.  - Slight cholesterol improvement, significant small dense LDL particles. Consider citrus bergamot for cholesterol management. Monitor levels and consider pharmacological intervention if needed.    7. Suspected mild sleep apnea.  - Discuss risks of sleep apnea, recommend sleep study, and consider evaluation with Dr. Restrepo.    8. Health maintenance.  - Provided resources for healthy meals, calorie tracking, and muscle strengthening. Suggested Couch to 10K simin. Reprinted Pap smear referral. Recommended dietary and activity resources.    Follow-up  - Discuss sleep apnea evaluation with Dr. Restrepo in October 2025.    It was a pleasure seeing Ms. Kristyn Contreras today.  Return in about 6 months (around 12/5/2025).    History of Present Illness  The patient, a 60-year-old female, presents for a 6-month follow-up regarding her chronic medical conditions.    Weight Gain  - Experiencing weight gain despite adhering to a balanced diet and engaging in inconsistent physical activity due to various interruptions.  - Historically maintained a regular exercise regimen until an ankle fracture and the onset of the

## 2025-08-20 ENCOUNTER — HOSPITAL ENCOUNTER (OUTPATIENT)
Facility: HOSPITAL | Age: 60
Discharge: HOME | End: 2025-08-23
Payer: COMMERCIAL

## 2025-08-20 ENCOUNTER — TRANSCRIBE ORDERS (OUTPATIENT)
Facility: HOSPITAL | Age: 60
End: 2025-08-20

## 2025-08-20 DIAGNOSIS — Z12.31 ENCOUNTER FOR SCREENING MAMMOGRAM FOR MALIGNANT NEOPLASM OF BREAST: Primary | ICD-10-CM

## 2025-08-20 DIAGNOSIS — Z12.31 ENCOUNTER FOR SCREENING MAMMOGRAM FOR MALIGNANT NEOPLASM OF BREAST: ICD-10-CM

## 2025-08-20 PROCEDURE — 77063 BREAST TOMOSYNTHESIS BI: CPT

## (undated) DEVICE — PERI GYN-SFMC: Brand: MEDLINE INDUSTRIES, INC.

## (undated) DEVICE — FLUID MGMT SYS FLUENT KIT 6/PK

## (undated) DEVICE — SET SEALS HYSTEROSCOPE DISP -- MYOSURE  EA=10

## (undated) DEVICE — DEVICE TISS REMOVAL -- ORDER AS BX     APO CODE = DRP

## (undated) DEVICE — SOLUTION IRRIG 3000ML 0.9% SOD CHL USP UROMATIC PLAS CONT

## (undated) DEVICE — GLOVE SURG SZ 65 THK91MIL LTX FREE SYN POLYISOPRENE